# Patient Record
Sex: FEMALE | Race: WHITE | NOT HISPANIC OR LATINO | Employment: PART TIME | URBAN - METROPOLITAN AREA
[De-identification: names, ages, dates, MRNs, and addresses within clinical notes are randomized per-mention and may not be internally consistent; named-entity substitution may affect disease eponyms.]

---

## 2020-06-07 ENCOUNTER — OFFICE VISIT (OUTPATIENT)
Dept: URGENT CARE | Age: 55
End: 2020-06-07
Payer: COMMERCIAL

## 2020-06-07 ENCOUNTER — APPOINTMENT (OUTPATIENT)
Dept: RADIOLOGY | Age: 55
End: 2020-06-07
Payer: COMMERCIAL

## 2020-06-07 VITALS
WEIGHT: 130 LBS | BODY MASS INDEX: 22.2 KG/M2 | SYSTOLIC BLOOD PRESSURE: 106 MMHG | OXYGEN SATURATION: 98 % | DIASTOLIC BLOOD PRESSURE: 74 MMHG | HEIGHT: 64 IN | HEART RATE: 78 BPM | RESPIRATION RATE: 18 BRPM | TEMPERATURE: 97.3 F

## 2020-06-07 DIAGNOSIS — S90.01XA CONTUSION OF RIGHT ANKLE, INITIAL ENCOUNTER: ICD-10-CM

## 2020-06-07 DIAGNOSIS — S80.02XA CONTUSION OF LEFT KNEE, INITIAL ENCOUNTER: ICD-10-CM

## 2020-06-07 DIAGNOSIS — M25.562 ACUTE PAIN OF LEFT KNEE: ICD-10-CM

## 2020-06-07 DIAGNOSIS — S99.919A ANKLE INJURY, UNSPECIFIED LATERALITY, INITIAL ENCOUNTER: ICD-10-CM

## 2020-06-07 DIAGNOSIS — S82.892A CLOSED FRACTURE OF LEFT ANKLE, INITIAL ENCOUNTER: Primary | ICD-10-CM

## 2020-06-07 PROCEDURE — 73610 X-RAY EXAM OF ANKLE: CPT

## 2020-06-07 PROCEDURE — 99213 OFFICE O/P EST LOW 20 MIN: CPT | Performed by: PHYSICIAN ASSISTANT

## 2020-06-07 PROCEDURE — 29515 APPLICATION SHORT LEG SPLINT: CPT | Performed by: PHYSICIAN ASSISTANT

## 2020-06-07 PROCEDURE — 73564 X-RAY EXAM KNEE 4 OR MORE: CPT

## 2020-06-07 PROCEDURE — S9088 SERVICES PROVIDED IN URGENT: HCPCS | Performed by: PHYSICIAN ASSISTANT

## 2020-06-07 RX ORDER — CARISOPRODOL 350 MG/1
TABLET ORAL EVERY 6 HOURS PRN
COMMUNITY
Start: 2020-05-22

## 2020-06-07 RX ORDER — LIOTHYRONINE SODIUM 5 UG/1
TABLET ORAL
COMMUNITY
Start: 2018-10-19

## 2020-06-07 RX ORDER — TEMAZEPAM 30 MG/1
CAPSULE ORAL
COMMUNITY
Start: 2018-11-08

## 2020-06-07 RX ORDER — HYDROXYCHLOROQUINE SULFATE 200 MG/1
TABLET, FILM COATED ORAL
COMMUNITY
Start: 2020-06-06

## 2020-06-07 RX ORDER — AMITRIPTYLINE HYDROCHLORIDE 10 MG/1
TABLET, FILM COATED ORAL
COMMUNITY
Start: 2018-11-02

## 2020-06-07 RX ORDER — MAG HYDROX/ALUMINUM HYD/SIMETH 400-400-40
1 SUSPENSION, ORAL (FINAL DOSE FORM) ORAL DAILY
COMMUNITY
Start: 2014-10-13

## 2020-06-07 RX ORDER — CLONAZEPAM 1 MG/1
TABLET ORAL
COMMUNITY
Start: 2020-06-06

## 2020-06-07 RX ORDER — FLUOXETINE HYDROCHLORIDE 40 MG/1
CAPSULE ORAL
COMMUNITY
Start: 2020-05-26

## 2020-06-07 RX ORDER — METHYLPHENIDATE HYDROCHLORIDE EXTENDED RELEASE 10 MG/1
TABLET ORAL
COMMUNITY
Start: 2020-05-13

## 2020-06-07 RX ORDER — SACCHAROMYCES BOULARDII 250 MG
CAPSULE ORAL
COMMUNITY
Start: 2011-03-16

## 2020-06-10 ENCOUNTER — OFFICE VISIT (OUTPATIENT)
Dept: OBGYN CLINIC | Facility: MEDICAL CENTER | Age: 55
End: 2020-06-10
Payer: COMMERCIAL

## 2020-06-10 VITALS
WEIGHT: 130 LBS | BODY MASS INDEX: 22.2 KG/M2 | TEMPERATURE: 97.8 F | DIASTOLIC BLOOD PRESSURE: 63 MMHG | HEIGHT: 64 IN | SYSTOLIC BLOOD PRESSURE: 129 MMHG | HEART RATE: 68 BPM

## 2020-06-10 DIAGNOSIS — S82.65XA CLOSED NONDISPLACED FRACTURE OF LATERAL MALLEOLUS OF LEFT FIBULA, INITIAL ENCOUNTER: Primary | ICD-10-CM

## 2020-06-10 PROCEDURE — 99203 OFFICE O/P NEW LOW 30 MIN: CPT | Performed by: ORTHOPAEDIC SURGERY

## 2020-06-10 PROCEDURE — 27786 TREATMENT OF ANKLE FRACTURE: CPT | Performed by: ORTHOPAEDIC SURGERY

## 2020-06-11 ENCOUNTER — TELEPHONE (OUTPATIENT)
Dept: OBGYN CLINIC | Facility: HOSPITAL | Age: 55
End: 2020-06-11

## 2020-06-12 DIAGNOSIS — S82.65XA CLOSED NONDISPLACED FRACTURE OF LATERAL MALLEOLUS OF LEFT FIBULA, INITIAL ENCOUNTER: Primary | ICD-10-CM

## 2020-06-12 RX ORDER — TRAMADOL HYDROCHLORIDE 50 MG/1
50 TABLET ORAL EVERY 8 HOURS PRN
Qty: 15 TABLET | Refills: 0 | Status: SHIPPED | OUTPATIENT
Start: 2020-06-12

## 2020-06-16 ENCOUNTER — TELEPHONE (OUTPATIENT)
Dept: OBGYN CLINIC | Facility: MEDICAL CENTER | Age: 55
End: 2020-06-16

## 2020-06-17 ENCOUNTER — APPOINTMENT (OUTPATIENT)
Dept: RADIOLOGY | Facility: MEDICAL CENTER | Age: 55
End: 2020-06-17
Payer: COMMERCIAL

## 2020-06-17 ENCOUNTER — OFFICE VISIT (OUTPATIENT)
Dept: OBGYN CLINIC | Facility: MEDICAL CENTER | Age: 55
End: 2020-06-17

## 2020-06-17 VITALS — HEIGHT: 64 IN | TEMPERATURE: 97.8 F | BODY MASS INDEX: 22.2 KG/M2 | WEIGHT: 130 LBS

## 2020-06-17 DIAGNOSIS — S82.65XD CLOSED NONDISPLACED FRACTURE OF LATERAL MALLEOLUS OF LEFT FIBULA WITH ROUTINE HEALING, SUBSEQUENT ENCOUNTER: Primary | ICD-10-CM

## 2020-06-17 DIAGNOSIS — S82.65XD CLOSED NONDISPLACED FRACTURE OF LATERAL MALLEOLUS OF LEFT FIBULA WITH ROUTINE HEALING, SUBSEQUENT ENCOUNTER: ICD-10-CM

## 2020-06-17 PROCEDURE — 73610 X-RAY EXAM OF ANKLE: CPT

## 2020-06-17 PROCEDURE — 99024 POSTOP FOLLOW-UP VISIT: CPT | Performed by: ORTHOPAEDIC SURGERY

## 2020-07-14 ENCOUNTER — TELEPHONE (OUTPATIENT)
Dept: OBGYN CLINIC | Facility: CLINIC | Age: 55
End: 2020-07-14

## 2020-07-14 NOTE — TELEPHONE ENCOUNTER
May return for sooner visit  Thanks  If any swelling or severe pain to go to ER immediately   thanks

## 2020-07-14 NOTE — TELEPHONE ENCOUNTER
Pt called and would like to know if she could come in Wednesday 7/15 to get her cast off instead of Monday 7/20  Pt said she has Lupus and the cast has really been giving her a hard time  Please advise

## 2020-07-14 NOTE — TELEPHONE ENCOUNTER
Geovanna patient  Is to follow up with Jean Carlos Espinoza on 7/20/20 for cast off repeat xray  Patient wanting to come in tomorrow because cast aggravates lupus  Please let me know if this is acceptable or not  Thank you!

## 2020-07-15 ENCOUNTER — APPOINTMENT (OUTPATIENT)
Dept: RADIOLOGY | Facility: MEDICAL CENTER | Age: 55
End: 2020-07-15
Payer: COMMERCIAL

## 2020-07-15 ENCOUNTER — APPOINTMENT (OUTPATIENT)
Dept: LAB | Facility: MEDICAL CENTER | Age: 55
End: 2020-07-15
Payer: COMMERCIAL

## 2020-07-15 ENCOUNTER — OFFICE VISIT (OUTPATIENT)
Dept: OBGYN CLINIC | Facility: MEDICAL CENTER | Age: 55
End: 2020-07-15
Payer: COMMERCIAL

## 2020-07-15 VITALS
HEART RATE: 75 BPM | SYSTOLIC BLOOD PRESSURE: 133 MMHG | DIASTOLIC BLOOD PRESSURE: 82 MMHG | BODY MASS INDEX: 22.2 KG/M2 | HEIGHT: 64 IN | WEIGHT: 130 LBS

## 2020-07-15 DIAGNOSIS — S82.65XD CLOSED NONDISPLACED FRACTURE OF LATERAL MALLEOLUS OF LEFT FIBULA WITH ROUTINE HEALING, SUBSEQUENT ENCOUNTER: ICD-10-CM

## 2020-07-15 DIAGNOSIS — S82.65XD CLOSED NONDISPLACED FRACTURE OF LATERAL MALLEOLUS OF LEFT FIBULA WITH ROUTINE HEALING, SUBSEQUENT ENCOUNTER: Primary | ICD-10-CM

## 2020-07-15 LAB
25(OH)D3 SERPL-MCNC: 38.9 NG/ML (ref 30–100)
TSH SERPL DL<=0.05 MIU/L-ACNC: 1.69 UIU/ML (ref 0.36–3.74)

## 2020-07-15 PROCEDURE — 36415 COLL VENOUS BLD VENIPUNCTURE: CPT

## 2020-07-15 PROCEDURE — 73610 X-RAY EXAM OF ANKLE: CPT

## 2020-07-15 PROCEDURE — 84443 ASSAY THYROID STIM HORMONE: CPT

## 2020-07-15 PROCEDURE — 99213 OFFICE O/P EST LOW 20 MIN: CPT | Performed by: FAMILY MEDICINE

## 2020-07-15 PROCEDURE — 82306 VITAMIN D 25 HYDROXY: CPT

## 2020-07-15 NOTE — PROGRESS NOTES
1  Closed nondisplaced fracture of lateral malleolus of left fibula with routine healing, subsequent encounter  XR ankle 3+ vw left    Vitamin D 25 hydroxy    [facility]    TSH, 3rd generation with Free T4 reflex     Orders Placed This Encounter   Procedures    XR ankle 3+ vw left    Vitamin D 25 hydroxy    TSH, 3rd generation with Free T4 reflex    [facility]        Imaging Studies (I personally reviewed images in PACS and report):  X-ray left ankle 07/15/2020:  Stable alignment nondisplaced distal fibular Cordova a fracture with interval healing and sclerosis but without complete obliteration of fracture line    IMPRESSION:  Left Ankle Cordova A Nondisplaced Distal Fibular Fracture  DOI: 6/7/20  FUI: 5 weeks 3 days      Repeat X-ray next visit:        Return in about 3 weeks (around 8/5/2020)  Patient Instructions   Cessation of cast  Transition to controlled ankle motion boot  Start physical therapy for range-of-motion exercises and gentle strengthening  Pleasevitamin-D check  Reviewed red flags including severe pain swelling and instructed to go to emergency department if develops    I recommend vitamin D 800-1000 IUs (international units) and Calcium 1500mg per day to help promote fracture healing  Calcium pills can lead to constipation and I recommend increasing calcium through the diet via dairy such as yogurt, cheese, or milk as tolerated  Vitamin D is usually taken by mouth in pill form over the counter  Sometimes vitamin D levels are too low and physicians may consider checking a blood test to see if you require extra Vitamin D for catch-up dosing if you are already low  CHIEF COMPLAINT:  Follow-up left ankle fracture    HPI:  Angeli Yarbrough is a 47 y o  female  who presents for       Visit 7/15/2020 :  68-year-old female with past medical history significant for lupus to presents for re-evaluation of ankle fracture    Patient previously seen by 1 of my colleagues for Art [place] fracture status post traumatic event including fall rising from a seated position  She was placed in a short leg cast made nonweightbearing  She follows up today with complaints of stiffness diffusely associated with lupus exacerbation from wearing her cast and being nonweightbearing  Review of Systems   Constitutional: Negative for chills and fever  Neurological: Negative for weakness and numbness  Following history reviewed and update:    Past Medical History:   Diagnosis Date    Disease of thyroid gland     Fibromyalgia     Lupus ([address] )     Proctitis     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HCC)     Vitiligo      Past Surgical History:   Procedure Laterality Date    ADENOIDECTOMY      APPENDECTOMY      CHOLECYSTECTOMY      OVARIAN CYST SURGERY      TONSILLECTOMY       Social History   Social History     Substance and Sexual Activity   Alcohol Use Not Currently     Social History     Substance and Sexual Activity   Drug Use Never     Social History     Tobacco Use   Smoking Status Never Smoker   Smokeless Tobacco Never Used     History reviewed  No pertinent family history  Allergies   Allergen Reactions    Androgens      Other reaction(s): Vomiting    Sulfa Antibiotics Other (See Comments)     Hives/Urticaria -Erythema Multiforme          Physical Exam  /82   Pulse 75   Ht 5' 4" (1 626 m)   Wt 59 kg (130 lb)   BMI 22 31 kg/m²     Constitutional:  see vital signs  Gen: well-developed, normocephalic/atraumatic, well-groomed  Eyes: No inflammation or discharge of conjunctiva or lids; sclera clear   Pharynx: no inflammation, lesion, or mass of lips  Neck: supple, no masses, non-distended  MSK: no inflammation, lesion, mass, or clubbing of nails and digits except for other than mentioned below  SKIN: no visible rashes or skin lesions  Pulmonary/Chest: Effort normal  No respiratory distress     NEURO: cranial nerves grossly intact  PSYCH:  Alert and oriented to person, place, and time; recent and remote memory intact; mood normal, no depression, anxiety, or agitation, judgment and insight good and intact     Ortho Exam    LEFT ANKLE  EXAM  Observation  GAIT:  Antalgic left mild    Inspection  Erythema: no  Ecchymosis: no  Edema:  Mild lateral ankle edema      Tenderness  Proximal Fibula: no  (Maisonneuve frx)  AiTFL: no  (2cm proximal-medial to tip lateral malleolus 92% sens, 29% spec)  ATFL: no  CFL: no  PTFL: no  Achilles:  no  deltoid: No  Peroneal: no  Tibialis Anterior: no  Tibialis Posterior: no     Bony Tenderpoints:  Lateral Malleolus: +  Base of 5th MT: no  Medial Malleolus: no  Navicular: no  Talar dome: No    ROM  Dorsiflexion: intact  Plantarflexion: intact    Muscle Strength  Pronation: intact without pain  Supination: intact without pain    Tib-Fib Squeeze: negative  (cgskbhrmwfwq-wa-brilawimdpkkfu squeeze; 26% sens, 88% spec; rule in test)    Calcaneal Squeeze: negative    Dorsiflexion (+) ER Stress Test: negative  (reproduce ATiFL mech; 71% sens, 63% spec)      Procedures

## 2020-07-15 NOTE — PATIENT INSTRUCTIONS
Cessation of cast  Transition to controlled ankle motion boot  Start physical therapy for range-of-motion exercises and gentle strengthening  Pleasevitamin-D check  Reviewed red flags including severe pain swelling and instructed to go to emergency department if develops    I recommend vitamin D 800-1000 IUs (international units) and Calcium 1500mg per day to help promote fracture healing  Calcium pills can lead to constipation and I recommend increasing calcium through the diet via dairy such as yogurt, cheese, or milk as tolerated  Vitamin D is usually taken by mouth in pill form over the counter  Sometimes vitamin D levels are too low and physicians may consider checking a blood test to see if you require extra Vitamin D for catch-up dosing if you are already low

## 2020-08-02 ENCOUNTER — OFFICE VISIT (OUTPATIENT)
Dept: URGENT CARE | Age: 55
End: 2020-08-02
Payer: COMMERCIAL

## 2020-08-02 VITALS
TEMPERATURE: 98 F | OXYGEN SATURATION: 96 % | SYSTOLIC BLOOD PRESSURE: 119 MMHG | DIASTOLIC BLOOD PRESSURE: 71 MMHG | HEIGHT: 64 IN | WEIGHT: 130 LBS | HEART RATE: 84 BPM | RESPIRATION RATE: 18 BRPM | BODY MASS INDEX: 22.2 KG/M2

## 2020-08-02 DIAGNOSIS — B49 FUNGAL INFECTION: Primary | ICD-10-CM

## 2020-08-02 PROCEDURE — S9088 SERVICES PROVIDED IN URGENT: HCPCS | Performed by: NURSE PRACTITIONER

## 2020-08-02 PROCEDURE — 99213 OFFICE O/P EST LOW 20 MIN: CPT | Performed by: NURSE PRACTITIONER

## 2020-08-02 RX ORDER — NYSTATIN 100000 U/G
OINTMENT TOPICAL 2 TIMES DAILY
Qty: 30 G | Refills: 0 | Status: SHIPPED | OUTPATIENT
Start: 2020-08-02

## 2020-08-02 NOTE — PROGRESS NOTES
NAME: Raphael Fraser is a 54 y o  female  : 1965    MRN: [id number]    /71   Pulse 84   Temp 98 °F (36 7 °C)   Resp 18   Ht 5' 4"   Wt 59 kg (130 lb)   SpO2 96%   BMI 22 31 kg/m²     Assessment and Plan   Fungal infection [B49]  1  Fungal infection  nystatin (MYCOSTATIN) ointment       Will Reges was seen today for rash  Diagnoses and all orders for this visit:    Fungal infection  -     nystatin (MYCOSTATIN) ointment; Apply topically 2 (two) times a day        Patient Instructions   Patient Instructions   Use ointment to the area of the rash  May use up to two times a day  Rest   Follow up with pcp    Proceed to ER if symptoms worsen  Chief Complaint     Chief Complaint   Patient presents with    Rash     started last night, Pt noticed that where she has a scar on her lower abdominal it was bright red and pussing           History of Present Illness     53 yo female here today with a rash under the abdominal fold, present for the past few days, itchy in nature and bothersome  No fevers      Review of Systems   Review of Systems   Constitutional: Negative for fever  Skin: Positive for rash (under the abdominal fold)           Current Medications       Current Outpatient Medications:     amitriptyline (ELAVIL) 10 mg tablet, , Disp: , Rfl:     carisoprodol (SOMA) 350 mg tablet, every 6 (six) hours as needed , Disp: , Rfl:     Cholecalciferol (VITAMIN D3) 125 MCG (5000 UT) CAPS, Take 1 capsule by mouth daily, Disp: , Rfl:     clonazePAM (KlonoPIN) 1 mg tablet, , Disp: , Rfl:     FLUoxetine (PROzac) 40 MG capsule, TAKE TWO CAPSULES BY MOUTH ONCE EVERY DAY, Disp: , Rfl:     hydroxychloroquine (PLAQUENIL) 200 mg tablet, , Disp: , Rfl:     liothyronine (CYTOMEL) 5 mcg tablet, , Disp: , Rfl:     methylphenidate (METADATE ER) 10 MG ER tablet, TAKE ONE TABLET BY MOUTH TWICE DAILY IN THE MORNING AND IN THE AFTERNOON, Disp: , Rfl:     Multiple Vitamins-Minerals (MULTIVITAMIN ADULT PO), 1 daily, Disp: , Rfl:     nystatin (MYCOSTATIN) ointment, Apply topically 2 (two) times a day, Disp: 30 g, Rfl: 0    OMEGA-3 FATTY ACIDS PO, Take 1 capsule by mouth daily, Disp: , Rfl:     saccharomyces boulardii (FLORASTOR) 250 mg capsule, 1 daily, Disp: , Rfl:     temazepam (RESTORIL) 30 mg capsule, temazepam 30 mg capsule, Disp: , Rfl:     traMADol (ULTRAM) 50 mg tablet, Take 1 tablet (50 mg total) by mouth every 8 (eight) hours as needed for moderate pain, Disp: 15 tablet, Rfl: 0    Current Allergies     Allergies as of 08/02/2020 - Reviewed 08/02/2020   Allergen Reaction Noted    Androgens  09/09/2019    Sulfa antibiotics Other (See Comments) 06/05/2015              Past Medical History:   Diagnosis Date    Disease of thyroid gland     Fibromyalgia     Lupus ([address] )     Proctitis     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HCC)     Vitiligo        Past Surgical History:   Procedure Laterality Date    ADENOIDECTOMY      APPENDECTOMY      CHOLECYSTECTOMY      OVARIAN CYST SURGERY      TONSILLECTOMY         History reviewed  No pertinent family history  Medications have been verified  The following portions of the patient's history were reviewed and updated as appropriate: allergies, current medications, past family history, past medical history, past social history, past surgical history and problem list     Objective   /71   Pulse 84   Temp 98 °F (36 7 °C)   Resp 18   Ht 5' 4"   Wt 59 kg (130 lb)   SpO2 96%   BMI 22 31 kg/m²      Physical Exam     Physical Exam   Neurological: She is alert  Skin: Rash (fungal rash present) noted              SHAY Cain

## 2020-08-05 ENCOUNTER — APPOINTMENT (OUTPATIENT)
Dept: RADIOLOGY | Facility: MEDICAL CENTER | Age: 55
End: 2020-08-05
Payer: COMMERCIAL

## 2020-08-05 ENCOUNTER — OFFICE VISIT (OUTPATIENT)
Dept: OBGYN CLINIC | Facility: MEDICAL CENTER | Age: 55
End: 2020-08-05
Payer: COMMERCIAL

## 2020-08-05 VITALS
WEIGHT: 130 LBS | DIASTOLIC BLOOD PRESSURE: 77 MMHG | SYSTOLIC BLOOD PRESSURE: 121 MMHG | HEART RATE: 79 BPM | BODY MASS INDEX: 22.2 KG/M2 | HEIGHT: 64 IN | TEMPERATURE: 97.5 F

## 2020-08-05 DIAGNOSIS — S82.65XD CLOSED NONDISPLACED FRACTURE OF LATERAL MALLEOLUS OF LEFT FIBULA WITH ROUTINE HEALING, SUBSEQUENT ENCOUNTER: Primary | ICD-10-CM

## 2020-08-05 DIAGNOSIS — S82.65XD CLOSED NONDISPLACED FRACTURE OF LATERAL MALLEOLUS OF LEFT FIBULA WITH ROUTINE HEALING, SUBSEQUENT ENCOUNTER: ICD-10-CM

## 2020-08-05 PROCEDURE — 73610 X-RAY EXAM OF ANKLE: CPT

## 2020-08-05 PROCEDURE — 99213 OFFICE O/P EST LOW 20 MIN: CPT | Performed by: FAMILY MEDICINE

## 2020-08-05 NOTE — PATIENT INSTRUCTIONS
Cease controlled ankle motion boot  Start lace-up ankle brace for high-risk activity including long walks  Start physical therapy

## 2020-08-05 NOTE — PROGRESS NOTES
1  Closed nondisplaced fracture of lateral malleolus of left fibula with routine healing, subsequent encounter  XR ankle 3+ vw left    [facility]     Orders Placed This Encounter   Procedures    XR ankle 3+ vw left    [facility]        Imaging Studies (I personally reviewed images in PACS and report):  X-ray left ankle 08/05/2020:  Stable alignment nondisplaced distal fibular Cordova a fracture with significant interval healing but without complete obliteration of fracture line  Past Diagnostics:  X-ray left ankle 07/15/2020:  Stable alignment nondisplaced distal fibular Cordova a fracture with interval healing and sclerosis but without complete obliteration of fracture line    IMPRESSION:  Left Ankle Cordova A Nondisplaced Distal Fibular Fracture  DOI: 6/7/20  FUI: 8 weeks 3 days      Repeat X-ray next visit:        Return in about 4 weeks (around 9/2/2020)  Patient Instructions   Cease controlled ankle motion boot  Start lace-up ankle brace for high-risk activity including long walks  Start physical therapy          CHIEF COMPLAINT:  Follow-up left ankle fracture    HPI:  Cesario Ly is a 54 y o  female  who presents for     08/05/2020: Follow-up left ankle fracture:  Improving  Overall patient feels approximately 95% improved  She has continued use her controlled ankle motion boot with no pain  Review of Systems   Constitutional: Negative for chills and fever  Neurological: Negative for weakness and numbness         Following history reviewed and update:    Past Medical History:   Diagnosis Date    Disease of thyroid gland     Fibromyalgia     Lupus ([address] )     Proctitis     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HCC)     Vitiligo      Past Surgical History:   Procedure Laterality Date    ADENOIDECTOMY      APPENDECTOMY      CHOLECYSTECTOMY      OVARIAN CYST SURGERY      TONSILLECTOMY       Social History   Social History     Substance and Sexual Activity   Alcohol Use Not Currently Social History     Substance and Sexual Activity   Drug Use Never     Social History     Tobacco Use   Smoking Status Never Smoker   Smokeless Tobacco Never Used     History reviewed  No pertinent family history  Allergies   Allergen Reactions    Androgens      Other reaction(s): Vomiting    Sulfa Antibiotics Other (See Comments)     Hives/Urticaria -Erythema Multiforme          Physical Exam  /77   Pulse 79   Temp 97 5 °F (36 4 °C)   Ht 5' 4" (1 626 m)   Wt 59 kg (130 lb)   BMI 22 31 kg/m²     Constitutional:  see vital signs  Gen: well-developed, normocephalic/atraumatic, well-groomed  Eyes: No inflammation or discharge of conjunctiva or lids; sclera clear   Pharynx: no inflammation, lesion, or mass of lips  Neck: supple, no masses, non-distended  MSK: no inflammation, lesion, mass, or clubbing of nails and digits except for other than mentioned below  SKIN: no visible rashes or skin lesions  Pulmonary/Chest: Effort normal  No respiratory distress     NEURO: cranial nerves grossly intact  PSYCH:  Alert and oriented to person, place, and time; recent and remote memory intact; mood normal, no depression, anxiety, or agitation, judgment and insight good and intact      Ortho Exam    LEFT ANKLE  EXAM  Observation  GAIT:  normal out of [place] office    Inspection  Erythema: no  Ecchymosis: no  Edema:  none    Tenderness  Proximal Fibula: no  (Maisonneuve frx)  AiTFL: no  (2cm proximal-medial to tip lateral malleolus 92% sens, 29% spec)  ATFL: no  CFL: no  PTFL: no  Achilles:  no  deltoid: No  Peroneal: no  Tibialis Anterior: no  Tibialis Posterior: no     Bony Tenderpoints:  Lateral Malleolus: no  Base of 5th MT: no  Medial Malleolus: no  Navicular: no  Talar dome: No    ROM  Dorsiflexion: intact  Plantarflexion: intact    Muscle Strength  Pronation: intact without pain  Supination: intact without pain    Tib-Fib Squeeze: negative  (tysfiipykcik-mn-hbgcsupkyyvyse squeeze; 26% sens, 88% spec; rule in test)    Calcaneal Squeeze: negative    Dorsiflexion (+) ER Stress Test: negative  (reproduce ATiFL mech; 71% sens, 63% spec)        Procedures

## 2020-08-13 ENCOUNTER — EVALUATION (OUTPATIENT)
Dept: PHYSICAL THERAPY | Facility: MEDICAL CENTER | Age: 55
End: 2020-08-13
Payer: COMMERCIAL

## 2020-08-13 DIAGNOSIS — S82.65XD CLOSED NONDISPLACED FRACTURE OF LATERAL MALLEOLUS OF LEFT FIBULA WITH ROUTINE HEALING: Primary | ICD-10-CM

## 2020-08-13 DIAGNOSIS — M25.572 LEFT ANKLE PAIN, UNSPECIFIED CHRONICITY: ICD-10-CM

## 2020-08-13 PROCEDURE — 97161 PT EVAL LOW COMPLEX 20 MIN: CPT | Performed by: PHYSICAL THERAPIST

## 2020-08-13 PROCEDURE — 97112 NEUROMUSCULAR REEDUCATION: CPT | Performed by: PHYSICAL THERAPIST

## 2020-08-13 PROCEDURE — 97110 THERAPEUTIC EXERCISES: CPT | Performed by: PHYSICAL THERAPIST

## 2020-08-13 NOTE — PROGRESS NOTES
PT Evaluation     Today's date: 2020  Patient name: Aixa Oneal  : 1965  MRN: [id number]  Referring provider: Osiris Medina  Dx:   Encounter Diagnosis     ICD-10-CM    1  Closed nondisplaced fracture of lateral malleolus of left fibula with routine healing  S82 65XD    2  Left ankle pain, unspecified chronicity  M25 572                   Assessment  Assessment details: Ms Isidro Victoria is a very pleasant 54 y o  female who presents today roughly 2 months s/p closed non-displaced fracture of the left lateral malleolus  No further referral appears necessary at this time based upon examination findings  Patient presents with primary movement diagnosis of left ankle motor control impairments consistent with referring diagnosis leading to pain and activity intolerance to walking, specifically for exercise  Patient demonstrates excellent ankle ROM but does exhibit increased mobility to ankle inversion bilaterally  Patient has good strength  Patient is an excellent candidate for outpatient physical therapy services to address the observed impairments to improve function and return to walking for exercise and stress management  Prognosis is good given compliance with PT attendance and HEP performance  Please contact me with any questions  Thank you for the referral    Impairments: abnormal muscle firing, activity intolerance, impaired balance, impaired physical strength and lacks appropriate home exercise program  Barriers to therapy: Lupus, fibromyalgia  Understanding of Dx/Px/POC: good   Prognosis: good    Goals  1  Patient will be independent in individualized HEP  2  Patient will improve left ankle strength to 5/5 in all planes  3  Patient will be improve SLS to WNL compared to contralateral side  4  Patient will be able to walk for exercise without limitation due to pain at time of discharge  5  Patient will achieve score on FOTO by MDIC by time of discharge        Plan  Patient would benefit from: skilled physical therapy  Planned modality interventions: cryotherapy  Planned therapy interventions: manual therapy, patient education, strengthening, therapeutic activities, therapeutic exercise, stretching, functional ROM exercises, home exercise program and balance  Frequency: 1x week  Duration in weeks: 6  Plan of Care beginning date: 2020  Plan of Care expiration date: 2020  Treatment plan discussed with: patient        Subjective Evaluation    History of Present Illness  Date of onset: 2020  Mechanism of injury: Ms Josafat Milligan is a 54 y o  female who presents today with reports of left ankle pain and stiffness s/p fibula fracture  Patient reports she sitting in an antigravity lounge chair when she went to get up she twisted her ankle  She reported immediate pain and swelling  That night she followed up with urgent care and received x-rays revealing a non displaced fracture of the left lateral malleolus  She was casted for 5-6 weeks, NWB for 2 weeks then transitioning to WBAT  When the cast was removed she was placed in a walking boot for 3 weeks and then transitioned to a lace up ankle brace  She states her pain is now well managed but it can fluctuate with changes in weather  Patient enjoys walking and uses this as an outlet for stress relief and is eager to return to this activity at [facility]  Pain  Current pain ratin  At best pain ratin  At worst pain ratin  Relieving factors: ice, heat and rest (elevate)  Exacerbated by: weather changes, prolonged walking  Progression: improved    Social Support    Employment status: not working  Exercise comments: 6-7 miles/day pre-injury  Diagnostic Tests  X-ray: abnormal (non-displaced fracture of left lateral malleolus, most recent x-ray shows healing and normal alignment)  Patient Goals  Patient goals for therapy: return to sport/leisure activities  Patient's goals regarding treatment: wear a regular shoe          Objective     Observations Left Ankle/Foot   Negative for edema and effusion  Additional Observation Details  SLS: right  = good, left = fair-good with increased ankle strategy    Tenderness   Left Ankle/Foot   No tenderness in the anterior talofibular ligament, fifth metatarsal base, first metatarsal head, lateral malleolus, medial malleolus, metatarsal head, peroneal tendon and posterior tibial tendon  Right Ankle/Foot   No tenderness in the anterior talofibular ligament, fifth metatarsal base, first metatarsal head, lateral malleolus and medial malleolus       Active Range of Motion   Left Ankle/Foot   Dorsiflexion (ke): 10 degrees   Dorsiflexion (kf): 16 degrees   Plantar flexion: 50 degrees   Inversion: 62 degrees with pain  Eversion: 20 degrees     Right Ankle/Foot   Normal active range of motion    Additional Active Range of Motion Details  Hypermobility ankle inversion bilaterally    Strength/Myotome Testing     Left Ankle/Foot   Dorsiflexion: 5  Plantar flexion: 4+ (x5 SL HR with decreased clearance)  Inversion: 4+  Eversion: 4+    Right Ankle/Foot   Normal strength    Tests     Additional Tests Details  Not indicated    Swelling   Left Ankle/Foot   Metatarsal heads: 20 5 cm  Figure 8: 51 cm  Malleoli: 24 5 cm    Right Ankle/Foot   Metatarsal heads: 20 5 cm  Figure 8: 51 cm  Malleoli: 25 cm      Flowsheet Rows      Most Recent Value   PT/OT G-Codes   Current Score  72   Projected Score  77   FOTO information reviewed  Yes             Precautions:lupus, fibromyalgia  DOI:6/7/2020      Manuals 8/13                                                                Neuro Re-Ed             Ankle ABC's nv            Ankle TB 4-way [place] nv            BAPs board nv            Tandem walking nv            SLS ABC's             rockerboard                          Ther Ex             Bike- active warm up nv            Calf stretch standing nv            Heel raises nv            Toe raises nv HEP instruction and performance CK            Ther Activity                                                                              Modalities             CP post tx prn

## 2020-08-18 ENCOUNTER — APPOINTMENT (OUTPATIENT)
Dept: PHYSICAL THERAPY | Facility: MEDICAL CENTER | Age: 55
End: 2020-08-18
Payer: COMMERCIAL

## 2020-08-26 ENCOUNTER — TELEPHONE (OUTPATIENT)
Dept: OBGYN CLINIC | Facility: MEDICAL CENTER | Age: 55
End: 2020-08-26

## 2020-08-26 NOTE — TELEPHONE ENCOUNTER
Patient has appt with Dr Joy Poole on 9/2  Doctor will be out of the office  I called patient and no answer  LVM to give us a call back to reschedule  He can be rescheduled with Dr Aishwarya Jansen or with Dr April Poole will no longer in be the [place] office as of the week of 8/31  He will be in the [place] or [facility] office

## 2020-08-27 ENCOUNTER — OFFICE VISIT (OUTPATIENT)
Dept: PHYSICAL THERAPY | Facility: MEDICAL CENTER | Age: 55
End: 2020-08-27
Payer: COMMERCIAL

## 2020-08-27 DIAGNOSIS — S82.65XD CLOSED NONDISPLACED FRACTURE OF LATERAL MALLEOLUS OF LEFT FIBULA WITH ROUTINE HEALING: Primary | ICD-10-CM

## 2020-08-27 DIAGNOSIS — M25.572 LEFT ANKLE PAIN, UNSPECIFIED CHRONICITY: ICD-10-CM

## 2020-08-27 PROCEDURE — 97110 THERAPEUTIC EXERCISES: CPT | Performed by: PHYSICAL THERAPIST

## 2020-08-27 PROCEDURE — 97112 NEUROMUSCULAR REEDUCATION: CPT | Performed by: PHYSICAL THERAPIST

## 2020-08-27 NOTE — PROGRESS NOTES
Daily Note     Today's date: 2020  Patient name: Yenny Muhammad  : 1965  MRN: [id number]  Referring provider: Eren Henry  Dx:   Encounter Diagnosis     ICD-10-CM    1  Closed nondisplaced fracture of lateral malleolus of left fibula with routine healing  S82 65XD    2  Left ankle pain, unspecified chronicity  M25 572                   Subjective: Patient states she walked 3 miles last week which she thinks was too much  She was very sore after but has returned to baseline  Objective: See treatment diary below      Assessment: Patient tolerated treatment session well  Treatment session focused on motor control and ankle proprioception  Patient required moderate cueing for isolated inversion and eversion with TB exercises  Patient also required cueing for speed with exercises to prevent her from rushing through them  She stated she felt good post treatment session  She will benefit from continued PT services to improve left ankle motor control and proprioception to allow her to return to participation in all activities at [facility]  Plan: Continue per plan of care  Progress treatment as tolerated         Precautions:lupus, fibromyalgia  DOI:2020      Manuals                                                                Neuro Re-Ed             Ankle ABC's nv HEP           Ankle TB 4-way [place] nv [place] x30 ea           BAPs board nv L3 x20 ea           Tandem walking nv 20 ft x5           SLS ABC's  10" x10           rockerboard  nv                        Ther Ex             Bike- active warm up nv x10'           Calf stretch standing nv 30"x3           Heel raises nv 3x10           Toe raises nv 3x10           Step ups w/ SL balance  L2 x20           Lateral step downs  nv                        HEP instruction and performance CK            Ther Activity                                                                              Modalities             CP post tx prn

## 2020-09-06 ENCOUNTER — OFFICE VISIT (OUTPATIENT)
Dept: URGENT CARE | Age: 55
End: 2020-09-06
Payer: COMMERCIAL

## 2020-09-06 VITALS
HEIGHT: 64 IN | SYSTOLIC BLOOD PRESSURE: 111 MMHG | OXYGEN SATURATION: 99 % | WEIGHT: 140.4 LBS | BODY MASS INDEX: 23.97 KG/M2 | TEMPERATURE: 97.2 F | HEART RATE: 79 BPM | DIASTOLIC BLOOD PRESSURE: 56 MMHG | RESPIRATION RATE: 18 BRPM

## 2020-09-06 DIAGNOSIS — N34.2 INFECTIVE URETHRITIS: Primary | ICD-10-CM

## 2020-09-06 DIAGNOSIS — J01.40 ACUTE NON-RECURRENT PANSINUSITIS: ICD-10-CM

## 2020-09-06 LAB
SL AMB  POCT GLUCOSE, UA: NEGATIVE
SL AMB LEUKOCYTE ESTERASE,UA: ABNORMAL
SL AMB POCT BILIRUBIN,UA: ABNORMAL
SL AMB POCT BLOOD,UA: ABNORMAL
SL AMB POCT CLARITY,UA: CLEAR
SL AMB POCT COLOR,UA: ABNORMAL
SL AMB POCT KETONES,UA: NEGATIVE
SL AMB POCT NITRITE,UA: NEGATIVE
SL AMB POCT PH,UA: 5
SL AMB POCT SPECIFIC GRAVITY,UA: 1.01
SL AMB POCT URINE PROTEIN: NEGATIVE
SL AMB POCT UROBILINOGEN: 0.2

## 2020-09-06 PROCEDURE — 87086 URINE CULTURE/COLONY COUNT: CPT | Performed by: PHYSICIAN ASSISTANT

## 2020-09-06 PROCEDURE — S9088 SERVICES PROVIDED IN URGENT: HCPCS | Performed by: PHYSICIAN ASSISTANT

## 2020-09-06 PROCEDURE — 81002 URINALYSIS NONAUTO W/O SCOPE: CPT | Performed by: PHYSICIAN ASSISTANT

## 2020-09-06 PROCEDURE — 99213 OFFICE O/P EST LOW 20 MIN: CPT | Performed by: PHYSICIAN ASSISTANT

## 2020-09-06 RX ORDER — AMOXICILLIN AND CLAVULANATE POTASSIUM 875; 125 MG/1; MG/1
1 TABLET, FILM COATED ORAL EVERY 12 HOURS SCHEDULED
Qty: 14 TABLET | Refills: 0 | Status: SHIPPED | OUTPATIENT
Start: 2020-09-06 | End: 2020-09-13

## 2020-09-06 NOTE — PATIENT INSTRUCTIONS
Antibiotic as directed until completed  Motrin and/or Tylenol as needed for fevers aches and pains  Drink plenty of fluids stay well hydrated  Continue home medications as prescribed  Follow up with PCP in 3-5 days  Proceed to  ER if symptoms worsen  Urinary Tract Infection in Women   AMBULATORY CARE:   A urinary tract infection (UTI)  is caused by bacteria that get inside your urinary tract  Most bacteria that enter your urinary tract come out when you urinate  If the bacteria stay in your urinary tract, you may get an infection  Your urinary tract includes your kidneys, ureters, bladder, and urethra  Urine is made in your kidneys, and it flows from the ureters to the bladder  Urine leaves the bladder through the urethra  A UTI is more common in your lower urinary tract, which includes your bladder and urethra  Common symptoms include the following:   · Urinating more often or waking from sleep to urinate    · Pain or burning when you urinate    · Pain or pressure in your lower abdomen     · Urine that smells bad    · Blood in your urine    · Leaking urine  Seek care immediately if:   · You are urinating very little or not at all  · You have a high fever with shaking chills  · You have side or back pain that gets worse  Contact your healthcare provider if:   · You have a fever  · You do not feel better after 2 days of taking antibiotics  · You are vomiting  · You have questions or concerns about your condition or care  Treatment for a UTI  may include medicines to treat a bacterial infection  You may also need medicines to decrease pain and burning, or decrease the urge to urinate often  Prevent a UTI:   · Empty your bladder often  Urinate and empty your bladder as soon as you feel the need  Do not hold your urine for long periods of time  · Wipe from front to back after you urinate or have a bowel movement    This will help prevent germs from getting into your urinary tract through your urethra  · Drink liquids as directed  Ask how much liquid to drink each day and which liquids are best for you  You may need to drink more liquids than usual to help flush out the bacteria  Do not drink alcohol, caffeine, or citrus juices  These can irritate your bladder and increase your symptoms  Your healthcare provider may recommend cranberry juice to help prevent a UTI  · Urinate after you have sex  This can help flush out bacteria passed during sex  · Do not douche or use feminine deodorants  These can change the chemical balance in your vagina  · Change sanitary pads or tampons often  This will help prevent germs from getting into your urinary tract  · Do pelvic muscle exercises often  Pelvic muscle exercises may help you start and stop urinating  Strong pelvic muscles may help you empty your bladder easier  Squeeze these muscles tightly for 5 seconds like you are trying to hold back urine  Then relax for 5 seconds  Gradually work up to squeezing for 10 seconds  Do 3 sets of 15 repetitions a day, or as directed  Follow up with your healthcare provider as directed:  Write down your questions so you remember to ask them during your visits  © 2017 [facility] Information is for End User's use only and may not be sold, redistributed or otherwise used for commercial purposes  All illustrations and images included in CareNotes® are the copyrighted property of [employer] D [employer]  or [employer]  The above information is an  only  It is not intended as medical advice for individual conditions or treatments  Talk to your doctor, nurse or pharmacist before following any medical regimen to see if it is safe and effective for you  Sinusitis   AMBULATORY CARE:   Sinusitis  is inflammation or infection of your sinuses  It is most often caused by a virus  Acute sinusitis may last up to 12 weeks  Chronic sinusitis lasts longer than 12 weeks  Recurrent sinusitis means you have 4 or more times in 1 year  Common symptoms include the following:   · Fever    · Pain, pressure, redness, or swelling around the forehead, cheeks, or eyes    · Thick yellow or green discharge from your nose    · Tenderness when you touch your face over your sinuses    · Dry cough that happens mostly at night or when you lie down    · Headache and face pain that is worse when you lean forward    · Tooth pain, or pain when you chew  Seek care immediately if:   · Your eye and eyelid are red, swollen, and painful  · You cannot open your eye  · You have vision changes, such as double vision  · Your eyeball bulges out or you cannot move your eye  · You are more sleepy than normal, or you notice changes in your ability to think, move, or talk  · You have a stiff neck, a fever, or a bad headache  · You have swelling of your forehead or scalp  Contact your healthcare provider if:   · Your symptoms do not improve after 3 days  · Your symptoms do not go away after 10 days  · You have nausea and are vomiting  · Your nose is bleeding  · You have questions or concerns about your condition or care  Treatment for sinusitis:  Your symptoms may go away on their own  Your healthcare provider may recommend watchful waiting for up to 10 days before starting antibiotics  You may  need any of the following:  · Acetaminophen  decreases pain and fever  It is available without a doctor's order  Ask how much to take and how often to take it  Follow directions  Read the labels of all other medicines you are using to see if they also contain acetaminophen, or ask your doctor or pharmacist  Acetaminophen can cause liver damage if not taken correctly  Do not use more than 4 grams (4,000 milligrams) total of acetaminophen in one day  · NSAIDs , such as ibuprofen, help decrease swelling, pain, and fever  This medicine is available with or without a doctor's order   NSAIDs can cause stomach bleeding or kidney problems in certain people  If you take blood thinner medicine, always ask your healthcare provider if NSAIDs are safe for you  Always read the medicine label and follow directions  · Nasal steroid sprays  may help decrease inflammation in your nose and sinuses  · Decongestants  help reduce swelling and drain mucus in the nose and sinuses  They may help you breathe easier  · Antihistamines  help dry mucus in the nose and relieve sneezing  · Antibiotics  help treat or prevent a bacterial infection  · Take your medicine as directed  Contact your healthcare provider if you think your medicine is not helping or if you have side effects  Tell him or her if you are allergic to any medicine  Keep a list of the medicines, vitamins, and herbs you take  Include the amounts, and when and why you take them  Bring the list or the pill bottles to follow-up visits  Carry your medicine list with you in case of an emergency  Self-care:   · Rinse your sinuses  Use a sinus rinse device to rinse your nasal passages with a saline (salt water) solution or distilled water  Do not use tap water  This will help thin the mucus in your nose and rinse away pollen and dirt  It will also help reduce swelling so you can breathe normally  Ask your healthcare provider how often to do this  · Breathe in steam   Heat a bowl of water until you see steam  Lean over the bowl and make a tent over your head with a large towel  Breathe deeply for about 20 minutes  Be careful not to get too close to the steam or burn yourself  Do this 3 times a day  You can also breathe deeply when you take a hot shower  · Sleep with your head elevated  Place an extra pillow under your head before you go to sleep to help your sinuses drain  · Drink liquids as directed  Ask your healthcare provider how much liquid to drink each day and which liquids are best for you   Liquids will thin the mucus in your nose and help it drain  Avoid drinks that contain alcohol or caffeine  · Do not smoke, and avoid secondhand smoke  Nicotine and other chemicals in cigarettes and cigars can make your symptoms worse  Ask your healthcare provider for information if you currently smoke and need help to quit  E-cigarettes or smokeless tobacco still contain nicotine  Talk to your healthcare provider before you use these products  Prevent the spread of germs that cause sinusitis:  Wash your hands often with soap and water  Wash your hands after you use the bathroom, change a child's diaper, or sneeze  Wash your hands before you prepare or eat food  Follow up with your healthcare provider as directed: You may be referred to an ear, nose, and throat specialist  Write down your questions so you remember to ask them during your visits  © 2017 [facility] [facility]  Information is for End User's use only and may not be sold, redistributed or otherwise used for commercial purposes  All illustrations and images included in CareNotes® are the copyrighted property of A [employer] M [employer] Inc  or [employer] Bowers  The above information is an  only  It is not intended as medical advice for individual conditions or treatments  Talk to your doctor, nurse or pharmacist before following any medical regimen to see if it is safe and effective for you

## 2020-09-06 NOTE — PROGRESS NOTES
[facility]        NAME: Yenny Muhammad is a 54 y o  female  : 1965    MRN: [id number]  DATE: 2020  TIME: 12:08 PM    Assessment and Plan   Infective urethritis [N34 2]  1  Infective urethritis  amoxicillin-clavulanate (AUGMENTIN) 875-125 mg per tablet    POCT urine dip    Urine culture   2  Acute non-recurrent pansinusitis  amoxicillin-clavulanate (AUGMENTIN) 875-125 mg per tablet         Patient Instructions     Antibiotic as directed until completed  Motrin and/or Tylenol as needed for fevers aches and pains  Drink plenty of fluids stay well hydrated  Continue home medications as prescribed  Follow up with PCP in 3-5 days  Proceed to  ER if symptoms worsen  Chief Complaint     Chief Complaint   Patient presents with    Possible UTI     past 2 days frequency lower back pain    Sinusitis     sinus headache and sinus pressure past 2 days         History of Present Illness       54-year-old female presents with 2 complaints  Patient reports she has been having UTI symptoms for the past 2 days  Frequency urgency  Denies any hematuria  No abdominal pain nausea vomiting or diarrhea  No diarrhea reported  Denies any vaginal discharge  Second complaint is some sinus pain and pressure for the past week  Has been using some Flonase with minimal relief  Denies any fevers or chills  Denies any sore throat or ear pain  Urinary Tract Infection    This is a new problem  The current episode started yesterday  The problem occurs every urination  The problem has been waxing and waning  The quality of the pain is described as burning  The pain is mild  There has been no fever  She is sexually active  There is no history of pyelonephritis  Associated symptoms include frequency and urgency  Pertinent negatives include no discharge, hematuria, hesitancy or vomiting  She has tried increased fluids for the symptoms  The treatment provided no relief  Sinusitis   This is a new problem   The current episode started 1 to 4 weeks ago  The problem has been waxing and waning since onset  There has been no fever  The pain is mild  Associated symptoms include sinus pressure  Pertinent negatives include no congestion, coughing, headaches, hoarse voice or sore throat  Past treatments include saline sprays, spray decongestants and lying down  The treatment provided no relief  Review of Systems   Review of Systems   Constitutional: Negative  HENT: Positive for rhinorrhea, sinus pressure and sinus pain  Negative for congestion, hoarse voice and sore throat  Eyes: Negative  Respiratory: Negative  Negative for cough  Cardiovascular: Negative  Gastrointestinal: Negative  Negative for vomiting  Genitourinary: Positive for dysuria, frequency and urgency  Negative for hematuria and hesitancy  Musculoskeletal: Negative  Skin: Negative  Neurological: Negative  Negative for headaches           Current Medications       Current Outpatient Medications:     amitriptyline (ELAVIL) 10 mg tablet, , Disp: , Rfl:     carisoprodol (SOMA) 350 mg tablet, every 6 (six) hours as needed , Disp: , Rfl:     Cholecalciferol (VITAMIN D3) 125 MCG (5000 UT) CAPS, Take 1 capsule by mouth daily, Disp: , Rfl:     clonazePAM (KlonoPIN) 1 mg tablet, , Disp: , Rfl:     FLUoxetine (PROzac) 40 MG capsule, TAKE TWO CAPSULES BY MOUTH ONCE EVERY DAY, Disp: , Rfl:     hydroxychloroquine (PLAQUENIL) 200 mg tablet, , Disp: , Rfl:     liothyronine (CYTOMEL) 5 mcg tablet, , Disp: , Rfl:     methylphenidate (METADATE ER) 10 MG ER tablet, TAKE ONE TABLET BY MOUTH TWICE DAILY IN THE MORNING AND IN THE AFTERNOON, Disp: , Rfl:     Multiple Vitamins-Minerals (MULTIVITAMIN ADULT PO), 1 daily, Disp: , Rfl:     nystatin (MYCOSTATIN) ointment, Apply topically 2 (two) times a day, Disp: 30 g, Rfl: 0    OMEGA-3 FATTY ACIDS PO, Take 1 capsule by mouth daily, Disp: , Rfl:     saccharomyces boulardii (FLORASTOR) 250 mg capsule, 1 daily, Disp: , Rfl:     temazepam (RESTORIL) 30 mg capsule, temazepam 30 mg capsule, Disp: , Rfl:     traMADol (ULTRAM) 50 mg tablet, Take 1 tablet (50 mg total) by mouth every 8 (eight) hours as needed for moderate pain, Disp: 15 tablet, Rfl: 0    amoxicillin-clavulanate (AUGMENTIN) 875-125 mg per tablet, Take 1 tablet by mouth every 12 (twelve) hours for 7 days, Disp: 14 tablet, Rfl: 0    Current Allergies     Allergies as of 09/06/2020 - Reviewed 09/06/2020   Allergen Reaction Noted    Androgens  09/09/2019    Sulfa antibiotics Other (See Comments) 06/05/2015            The following portions of the patient's history were reviewed and updated as appropriate: allergies, current medications, past family history, past medical history, past social history, past surgical history and problem list      Past Medical History:   Diagnosis Date    Disease of thyroid gland     Fibromyalgia     [facility]up[facility] ([facility] [address] [facility]     Proctitis     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[address] [facility]     Vitiligo        Past Surgical History:   Procedure Laterality Date    ADENOIDECTOMY      APPENDECTOMY      CHOLECYSTECTOMY      OVARIAN CYST SURGERY      TONSILLECTOMY         History reviewed  No pertinent family history  Medications have been verified  Objective   /56 (BP Location: Right arm, Patient Position: Sitting)   Pulse 79   Temp (!) 97 2 °F (36 2 °C) (Tympanic)   Resp 18   Ht 5' 4" (1 626 m)   Wt 63 7 kg (140 lb 6 4 oz)   SpO2 99%   BMI 24 10 kg/m²        Physical Exam     Physical Exam  Vitals signs and nursing note reviewed  Constitutional:       General: She is not in acute distress  Appearance: She is well-developed  HENT:      Head: Normocephalic and atraumatic        Right Ear: Hearing, tympanic membrane, ear canal and external ear normal       Left Ear: Hearing, tympanic membrane, ear canal and external ear normal       Nose:      Right Sinus: Maxillary sinus tenderness and frontal sinus tenderness present  Left Sinus: Maxillary sinus tenderness and frontal sinus tenderness present  Comments: Tenderness with percussion to sinus areas     Mouth/Throat:      Pharynx: Uvula midline  No oropharyngeal exudate  Eyes:      General:         Right eye: No discharge  Left eye: No discharge  Conjunctiva/sclera: Conjunctivae normal    Neck:      Musculoskeletal: Normal range of motion and neck supple  Cardiovascular:      Rate and Rhythm: Normal rate and regular rhythm  Heart sounds: Normal heart sounds  No murmur  Pulmonary:      Effort: Pulmonary effort is normal  No respiratory distress  Breath sounds: Normal breath sounds  No wheezing or rales  Abdominal:      General: Bowel sounds are normal       Palpations: Abdomen is soft  Tenderness: There is no abdominal tenderness  Musculoskeletal: Normal range of motion  Lymphadenopathy:      Cervical: No cervical adenopathy  Skin:     General: Skin is warm and dry  Neurological:      Mental Status: She is alert and oriented to person, place, and time

## 2020-09-07 LAB — BACTERIA UR CULT: NORMAL

## 2020-09-12 ENCOUNTER — OFFICE VISIT (OUTPATIENT)
Dept: OBGYN CLINIC | Facility: MEDICAL CENTER | Age: 55
End: 2020-09-12
Payer: COMMERCIAL

## 2020-09-12 ENCOUNTER — APPOINTMENT (OUTPATIENT)
Dept: RADIOLOGY | Facility: MEDICAL CENTER | Age: 55
End: 2020-09-12
Payer: COMMERCIAL

## 2020-09-12 VITALS — BODY MASS INDEX: 24.03 KG/M2 | WEIGHT: 140 LBS | DIASTOLIC BLOOD PRESSURE: 84 MMHG | SYSTOLIC BLOOD PRESSURE: 133 MMHG

## 2020-09-12 DIAGNOSIS — M25.572 PAIN, JOINT, ANKLE AND FOOT, LEFT: ICD-10-CM

## 2020-09-12 DIAGNOSIS — S82.65XD CLOSED NONDISPLACED FRACTURE OF LATERAL MALLEOLUS OF LEFT FIBULA WITH ROUTINE HEALING, SUBSEQUENT ENCOUNTER: Primary | ICD-10-CM

## 2020-09-12 PROCEDURE — 73610 X-RAY EXAM OF ANKLE: CPT

## 2020-09-12 PROCEDURE — 99213 OFFICE O/P EST LOW 20 MIN: CPT | Performed by: FAMILY MEDICINE

## 2020-09-12 NOTE — PATIENT INSTRUCTIONS
Transition to normal footwear and activities over the next 4 weeks including walking   May wear lace up ankle brace during high risk activities such as hiking, walking uneven surfaces, sports, running for up to 1 year    I recommended gradual return to running to include 1 weeks of walking 100%, 1 week of 50% walking/50% jogging 1 mile, followed by 1 week of 50% jogging/50% running 1 mile, followed by 1 week of 100% running 1 mile if pain free  At 4th week, I recommend increasing mileage at a slow pace

## 2020-09-12 NOTE — PROGRESS NOTES
1  Closed nondisplaced fracture of lateral malleolus of left fibula with routine healing, subsequent encounter     2  Pain, joint, ankle and foot, left  XR ankle 3+ vw left     Orders Placed This Encounter   Procedures    XR ankle 3+ vw left        Imaging Studies (I personally reviewed images in PACS and report):  X-ray left ankle 09/12/2020:  Stable alignment nondisplaced distal fibular Cordova a fracture with interval healing and almost complete obscuration of fracture line    Past Diagnostics:  X-ray left ankle 07/15/2020:  Stable alignment nondisplaced distal fibular Cordova a fracture with interval healing and sclerosis but without complete obliteration of fracture line    IMPRESSION:  Left Ankle Cordova A Nondisplaced Distal Fibular Fracture  DOI: 6/7/20  FUI: 13 weeks 6 days      Repeat X-ray next visit:        Return if symptoms worsen or fail to improve  Patient Instructions   Transition to normal footwear and activities over the next 4 weeks including walking   May wear lace up ankle brace during high risk activities such as hiking, walking uneven surfaces, sports, running for up to 1 year    I recommended gradual return to running to include 1 weeks of walking 100%, 1 week of 50% walking/50% jogging 1 mile, followed by 1 week of 50% jogging/50% running 1 mile, followed by 1 week of 100% running 1 mile if pain free  At 4th week, I recommend increasing mileage at a slow pace  CHIEF COMPLAINT:  Follow-up left ankle fracture    HPI:  Fouzia Smiley is a 54 y o  female  who presents for     08/05/2020: Follow-up left ankle fracture:  Improving  Overall patient feels approximately 95% improved  She has continued use her controlled ankle motion boot with no pain  09/12/2020: Follow-up left ankle fracture:  Significantly improved  90% improved overall  Denies any pain when walking  She has transition to lace-up ankle brace without any difficulty    She did attempt to walk 3 miles 1 day last month after she was significant improved with physical therapy but did have some swelling and pain next day  Since then she has not had any pain at but has not return to walking  She does perform home exercise program and does go to physical therapy  Review of Systems   Constitutional: Negative for chills and fever  Neurological: Negative for weakness and numbness  Following history reviewed and update:    Past Medical History:   Diagnosis Date    Disease of thyroid gland     Fibromyalgia     Lupus ([address] )     Proctitis     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HCC)     Vitiligo      Past Surgical History:   Procedure Laterality Date    ADENOIDECTOMY      APPENDECTOMY      CHOLECYSTECTOMY      OVARIAN CYST SURGERY      TONSILLECTOMY       Social History   Social History     Substance and Sexual Activity   Alcohol Use Not Currently     Social History     Substance and Sexual Activity   Drug Use Never     Social History     Tobacco Use   Smoking Status Never Smoker   Smokeless Tobacco Never Used     History reviewed  No pertinent family history  Allergies   Allergen Reactions    Androgens      Other reaction(s): Vomiting    Sulfa Antibiotics Other (See Comments)     Hives/Urticaria -Erythema Multiforme          Physical Exam  /84   Wt 63 5 kg (140 lb)   BMI 24 03 kg/m²     Constitutional:  see vital signs  Gen: well-developed, normocephalic/atraumatic, well-groomed  Eyes: No inflammation or discharge of conjunctiva or lids; sclera clear   Pharynx: no inflammation, lesion, or mass of lips  Neck: supple, no masses, non-distended  MSK: no inflammation, lesion, mass, or clubbing of nails and digits except for other than mentioned below  SKIN: no visible rashes or skin lesions  Pulmonary/Chest: Effort normal  No respiratory distress     NEURO: cranial nerves grossly intact  PSYCH:  Alert and oriented to person, place, and time; recent and remote memory intact; mood normal, no depression, anxiety, or agitation, judgment and insight good and intact      Ortho Exam  LEFT ANKLE  EXAM  Observation  GAIT:  normal    Inspection  Erythema: no  Ecchymosis: no  Edema:  none    Tenderness  Proximal Fibula: no  ([place] frx)  AiTFL: no  (2cm proximal-medial to tip lateral malleolus 92% sens, 29% spec)  ATFL: no  CFL: no  PTFL: no  Achilles:  no  deltoid: No  Peroneal: no  Tibialis Anterior: no  Tibialis Posterior: no     Bony Tenderpoints:  Lateral Malleolus: no  Base of 5th MT: no  Medial Malleolus: no  Navicular: no  Talar dome: No    ROM  Dorsiflexion: intact  Plantarflexion: intact    Muscle Strength  Pronation: intact without pain  Supination: intact without pain    Tib-Fib Squeeze: negative  ([id number] squeeze; 26% sens, 88% spec; rule in test)    Calcaneal Squeeze: negative    Dorsiflexion (+) ER Stress Test: negative  (reproduce ATiFL mech; 71% sens, 63% spec)          Procedures

## 2020-10-10 ENCOUNTER — AMB VIDEO VISIT (OUTPATIENT)
Dept: OTHER | Facility: HOSPITAL | Age: 55
End: 2020-10-10

## 2020-10-10 PROCEDURE — EVISIT: Performed by: FAMILY MEDICINE

## 2020-11-20 ENCOUNTER — OFFICE VISIT (OUTPATIENT)
Dept: URGENT CARE | Age: 55
End: 2020-11-20
Payer: COMMERCIAL

## 2020-11-20 VITALS
WEIGHT: 130 LBS | HEIGHT: 63 IN | OXYGEN SATURATION: 98 % | RESPIRATION RATE: 18 BRPM | HEART RATE: 62 BPM | BODY MASS INDEX: 23.04 KG/M2 | TEMPERATURE: 97.1 F

## 2020-11-20 DIAGNOSIS — J32.9 SINUSITIS, UNSPECIFIED CHRONICITY, UNSPECIFIED LOCATION: ICD-10-CM

## 2020-11-20 DIAGNOSIS — N39.0 URINARY TRACT INFECTION WITHOUT HEMATURIA, SITE UNSPECIFIED: Primary | ICD-10-CM

## 2020-11-20 DIAGNOSIS — Z20.822 ENCOUNTER FOR LABORATORY TESTING FOR COVID-19 VIRUS: ICD-10-CM

## 2020-11-20 LAB
SL AMB  POCT GLUCOSE, UA: NEGATIVE
SL AMB LEUKOCYTE ESTERASE,UA: ABNORMAL
SL AMB POCT BILIRUBIN,UA: NEGATIVE
SL AMB POCT BLOOD,UA: NEGATIVE
SL AMB POCT CLARITY,UA: CLEAR
SL AMB POCT COLOR,UA: ABNORMAL
SL AMB POCT KETONES,UA: NEGATIVE
SL AMB POCT NITRITE,UA: NEGATIVE
SL AMB POCT PH,UA: 7.5
SL AMB POCT SPECIFIC GRAVITY,UA: 1
SL AMB POCT URINE PROTEIN: NEGATIVE
SL AMB POCT UROBILINOGEN: 0.2

## 2020-11-20 PROCEDURE — 81002 URINALYSIS NONAUTO W/O SCOPE: CPT | Performed by: PHYSICIAN ASSISTANT

## 2020-11-20 PROCEDURE — 99213 OFFICE O/P EST LOW 20 MIN: CPT | Performed by: PHYSICIAN ASSISTANT

## 2020-11-20 PROCEDURE — S9088 SERVICES PROVIDED IN URGENT: HCPCS | Performed by: PHYSICIAN ASSISTANT

## 2020-11-20 PROCEDURE — 87086 URINE CULTURE/COLONY COUNT: CPT | Performed by: PHYSICIAN ASSISTANT

## 2020-11-20 PROCEDURE — U0003 INFECTIOUS AGENT DETECTION BY NUCLEIC ACID (DNA OR RNA); SEVERE ACUTE RESPIRATORY SYNDROME CORONAVIRUS 2 (SARS-COV-2) (CORONAVIRUS DISEASE [COVID-19]), AMPLIFIED PROBE TECHNIQUE, MAKING USE OF HIGH THROUGHPUT TECHNOLOGIES AS DESCRIBED BY CMS-2020-01-R: HCPCS | Performed by: PHYSICIAN ASSISTANT

## 2020-11-20 RX ORDER — LEVOFLOXACIN 500 MG/1
500 TABLET, FILM COATED ORAL EVERY 24 HOURS
Qty: 7 TABLET | Refills: 0 | Status: SHIPPED | OUTPATIENT
Start: 2020-11-20 | End: 2020-11-27

## 2020-11-21 LAB — BACTERIA UR CULT: NORMAL

## 2020-11-22 LAB — SARS-COV-2 RNA SPEC QL NAA+PROBE: NOT DETECTED

## 2020-11-27 ENCOUNTER — OFFICE VISIT (OUTPATIENT)
Dept: URGENT CARE | Age: 55
End: 2020-11-27
Payer: COMMERCIAL

## 2020-11-27 VITALS
BODY MASS INDEX: 23.04 KG/M2 | HEART RATE: 80 BPM | OXYGEN SATURATION: 98 % | HEIGHT: 63 IN | WEIGHT: 130 LBS | RESPIRATION RATE: 18 BRPM | TEMPERATURE: 96.9 F

## 2020-11-27 DIAGNOSIS — J01.90 ACUTE SINUSITIS, RECURRENCE NOT SPECIFIED, UNSPECIFIED LOCATION: ICD-10-CM

## 2020-11-27 DIAGNOSIS — R39.9 UTI SYMPTOMS: Primary | ICD-10-CM

## 2020-11-27 LAB
SL AMB  POCT GLUCOSE, UA: ABNORMAL
SL AMB LEUKOCYTE ESTERASE,UA: ABNORMAL
SL AMB POCT BILIRUBIN,UA: ABNORMAL
SL AMB POCT BLOOD,UA: ABNORMAL
SL AMB POCT CLARITY,UA: ABNORMAL
SL AMB POCT COLOR,UA: ABNORMAL
SL AMB POCT KETONES,UA: ABNORMAL
SL AMB POCT NITRITE,UA: ABNORMAL
SL AMB POCT PH,UA: 5
SL AMB POCT SPECIFIC GRAVITY,UA: 1
SL AMB POCT URINE PROTEIN: ABNORMAL
SL AMB POCT UROBILINOGEN: 0.2

## 2020-11-27 PROCEDURE — S9088 SERVICES PROVIDED IN URGENT: HCPCS | Performed by: PHYSICIAN ASSISTANT

## 2020-11-27 PROCEDURE — 81002 URINALYSIS NONAUTO W/O SCOPE: CPT | Performed by: PHYSICIAN ASSISTANT

## 2020-11-27 PROCEDURE — 99213 OFFICE O/P EST LOW 20 MIN: CPT | Performed by: PHYSICIAN ASSISTANT

## 2020-11-27 PROCEDURE — 87086 URINE CULTURE/COLONY COUNT: CPT | Performed by: PHYSICIAN ASSISTANT

## 2020-11-27 RX ORDER — CIPROFLOXACIN 500 MG/1
500 TABLET, FILM COATED ORAL EVERY 12 HOURS SCHEDULED
Qty: 10 TABLET | Refills: 0 | Status: SHIPPED | OUTPATIENT
Start: 2020-11-27 | End: 2020-12-02

## 2020-11-28 LAB — BACTERIA UR CULT: NORMAL

## 2020-12-04 ENCOUNTER — OFFICE VISIT (OUTPATIENT)
Dept: URGENT CARE | Age: 55
End: 2020-12-04
Payer: COMMERCIAL

## 2020-12-04 VITALS
RESPIRATION RATE: 18 BRPM | TEMPERATURE: 98.4 F | SYSTOLIC BLOOD PRESSURE: 128 MMHG | OXYGEN SATURATION: 96 % | DIASTOLIC BLOOD PRESSURE: 80 MMHG | HEART RATE: 85 BPM

## 2020-12-04 DIAGNOSIS — R30.0 DYSURIA: Primary | ICD-10-CM

## 2020-12-04 LAB
SL AMB  POCT GLUCOSE, UA: NORMAL
SL AMB LEUKOCYTE ESTERASE,UA: NORMAL
SL AMB POCT BILIRUBIN,UA: NORMAL
SL AMB POCT BLOOD,UA: NORMAL
SL AMB POCT CLARITY,UA: CLEAR
SL AMB POCT COLOR,UA: YELLOW
SL AMB POCT KETONES,UA: NORMAL
SL AMB POCT NITRITE,UA: NORMAL
SL AMB POCT PH,UA: 6
SL AMB POCT SPECIFIC GRAVITY,UA: 1.03
SL AMB POCT URINE PROTEIN: NORMAL
SL AMB POCT UROBILINOGEN: 0.2

## 2020-12-04 PROCEDURE — 99213 OFFICE O/P EST LOW 20 MIN: CPT | Performed by: PHYSICIAN ASSISTANT

## 2020-12-04 PROCEDURE — 81002 URINALYSIS NONAUTO W/O SCOPE: CPT | Performed by: PHYSICIAN ASSISTANT

## 2020-12-04 PROCEDURE — 87086 URINE CULTURE/COLONY COUNT: CPT | Performed by: PHYSICIAN ASSISTANT

## 2020-12-04 PROCEDURE — S9088 SERVICES PROVIDED IN URGENT: HCPCS | Performed by: PHYSICIAN ASSISTANT

## 2020-12-04 RX ORDER — PHENAZOPYRIDINE HYDROCHLORIDE 200 MG/1
200 TABLET, FILM COATED ORAL
Qty: 10 TABLET | Refills: 0 | Status: SHIPPED | OUTPATIENT
Start: 2020-12-04

## 2020-12-05 LAB — BACTERIA UR CULT: NORMAL

## 2021-02-21 ENCOUNTER — AMB VIDEO VISIT (OUTPATIENT)
Dept: OTHER | Facility: HOSPITAL | Age: 56
End: 2021-02-21

## 2021-02-21 PROCEDURE — ECARE PR SL URGENT CARE VIRTUAL VISIT: Performed by: FAMILY MEDICINE

## 2021-02-21 NOTE — CARE ANYWHERE EVISITS
Visit Summary for Opal Fernandes - Gender: Female - Date of Birth: [id number]  Date: [id number] - Duration: 5 minutes  Patient: Opal Fernandes  Provider: Jannet Serrano    Patient Contact Information  Address  [address]; [address]  [phone]    Visit Topics  Sinus pain  [Added By: Self - 2021-02-21]    Triage Questions   What is your current physical address in the event of a medical emergency? Answer []  Are you allergic to any medications? Answer []  Are you now or could you be pregnant? Answer []  Do you have any immune system compromise or chronic lung   disease? Answer []  Do you have any vulnerable family members in the home (infant, pregnant, cancer, elderly)? Answer []     Conversation Transcripts  [0A][0A] [Notification] You are connected with Jannet Serrano, Family Physician [0A][Notification] TAY Perez is located in [place]  [0A][Notification] Opal Fernandes has shared health history  Stacey Girma  [0A][Notification] Jannet Serrano has added   a diagnosis/procedure code  [0A][Notification] Jannet Serrano has added a diagnosis/procedure code  [0A][Notification] Jannet Serrano has added a diagnosis/procedure code  [0A][Notification] Jannet Serrano has added a prescription  [0A]    Diagnosis  Urinary tract infection, site not specified  Other acute sinusitis    Procedures  Value: [id number] Code: CPT-4 OL DIG E/M SVC 11-20 MIN    Medications Prescribed    cephalexin  Dose : 1 capsule  Route : oral  Frequency : 4 times a day  Refills : 0  Instructions to the Pharmacist : Substitutions allowed      Provider Notes  [0A][0A] Video visit[0A][0A][0A][0A]S: Dysuria and frequency of urination for 5 days  [0A][0A]No fever, nausea, vomiting or flank pain  No vaginal discharge or rash  [0A][0A]No abnormal vaginal bleeding [0A][0A]No history of UTI in the last 6   months  [0A]Stuffy nose and sinus congestion for several days and now with severe sinus headache and pressure    Has post nasal drainage  No cough  [0A][0A]PMH: lupus[0A][0A][0A][0A]Meds: plaquenil[0A][0A][0A][0A]Allergies: sulfa [0A][0A]O: Alert, in no   apparent distress  Looks comfortable  Answers questions appropriately  [0A][0A]Talking and breathing normally, no shortness of breath  [0A][0A]No CVAT  Abdomen soft, non tender [0A][0A][0A][0A]A: Urinary Tract Infection[0A]Sinusitis [0A][0A][0A][0A]P:   Cephalexin [0A]Flonase [0A][0A]OTC AZO or Uristat for the discomfort with urination  It may turn your urine bright orange [0A][0A]Follow up with your primary care provider, schedule another online visit, or go to the emergency department if you develop   a fever, nausea, vomiting, back or flank pain, or if your symptoms change, persist or worsen  If there is no improvement in 1-2 days, please follow up for an in person office visit to get provide a urine sample to send for culture  [0A][0A]Please call   the pharmacy hotline at [phone] if you have any questions about your prescription [0A][0A][0A][0A]I recommend that you have an in-person exam with your primary care provider annually  [0A][0A]I recommend that you share a copy of this visit with your   primary care provider to be included in your medical records  [0A]    Electronically signed byEfren Maldonado(NPI [id number])

## 2021-03-18 ENCOUNTER — AMB VIDEO VISIT (OUTPATIENT)
Dept: OTHER | Facility: HOSPITAL | Age: 56
End: 2021-03-18

## 2021-03-18 PROCEDURE — ECARE PR SL URGENT CARE VIRTUAL VISIT: Performed by: FAMILY MEDICINE

## 2021-03-18 NOTE — CARE ANYWHERE EVISITS
Visit Summary for aKiley Woodall - Gender: Female - Date of Birth: [id number]  Date: [id number] - Duration: 11 minutes  Patient: Kailey Woodall  Provider: Robert Blue    Patient Contact Information  Address  [address]; [address]  [phone]    Visit Topics  Bladder infection, ear pain sinus body ache  [Added By: Self - 2021]    Triage Questions   What is your current physical address in the event of a medical emergency? Answer []  Are you allergic to any medications? Answer []  Are you now or could you be pregnant? Answer []  Do you have any immune system compromise or chronic lung   disease? Answer []  Do you have any vulnerable family members in the home (infant, pregnant, cancer, elderly)? Answer []     Conversation Transcripts  [0A][0A] [Notification] You are connected with Robert Blue, Family Physician [0A][Notification] TAY Kent is located in [place]  [0A][Notification] Kailey Woodall has shared health history  Dartani Pac  [0A][Notification] Robert Blue has added a   diagnosis/procedure code  [0A][Notification] Robert Blue has added a diagnosis/procedure code  [0A][Notification] Robert Blue has deleted a diagnosis/procedure code  [0A][Notification] Robert Blue has added a prescription [0A][Notification]   Robert Blue has added a prescription  [0A]    Diagnosis  Acute sinusitis, unspecified    Procedures    Medications Prescribed    Flonase Allergy Relief  Dose : 2 sprays  Route : nasal  Frequency : every day  Until directed to stop  Patient Instructions : each nostril  Refills : 0  Instructions to the Pharmacist : Substitutions allowed    amoxicillin  Dose : 1 tablet  Route : oral  Frequency : every 12 hours  Until directed to stop       Refills : 0  Instructions to the Pharmacist : Substitutions allowed      Provider Notes  [0A][0A] pa, confirmed :  1965[0A]webcam[0A]TOPICS:Bladder infection, ear pain sinus body ache[0A]began  4 days ago, worsening[0A]head cold, sinus pain + pressure worsening, severe nasal congestion in head, PND thick mucus  sore throat, slight   cough, no sob, no wheeze or chest discomfort  no F, No chills, sweats BA  rx;  mucinex, tylneol flu, flonase mild temp relief  [0A]no covid exposureno change taste or smell+NLOA[0A]urinary sx, last uti 1 mo ago, pat agrees to please seek in person   care[0A]NAD, HA and sinus pressure w/ nasal congestion, worsening, severe RAD to cheeks fh under eye and ears,   severe, sore throat, ln ttp, breathing and speaking comfortably[0A]ALLERGIES: sulfa[0A]DAILY MEDS/SUPPLEMENTS: plaquenil since 2008[0A]PMH:   SLE, AR[0A]normal kidney and liver function[0A]SHX:[0A]not pregnant or bf[0A]Diagnosis:[0A]sinusitis, acute: allergic vs viral vs bacterial, most likely[0A]uti symptoms, last uti 1 mo ago, patient agrees to please seek in person care with pcp or urgent   care to please test urine and culture prior to starting antibioitcs[0A]SLE[0A]Treatment:[0A]rest[0A]steam inhalation[0A]nasal saline as directed as needed[0A]salt gargles[0A]increase fluid intake[0A]humidifier[0A]flonase, 2 jetts each nostril   daily[0A]tylenol  or ibuprofen as directed as needed for fever or body aches, stop if any stomach upset[0A]amoxicllin, as directed, please complete entire course[0A]Daily yogurt or probioitc for at least 3-6 months to replace the digestive bacteria lost   to antibiotic use  [0A]ALL antibioitcs can increase your risk for a yeast infection, may take any over the counter anti fungal regimen as directed as needed or may reconnect for other treatment options  [0A]Patient agrees to please follow up with your   Primary Care Physician for full evaluation, and additional treatment if not improving or worsening   Patient understands the need for an in person exam and ear evaluation by otoscope and agrees to follow up with her PCP or Urgent Care or ER if not   improving or worsening  [0A]Please reconnect or send a secure message anytime, as needed  [0A]Please send me a secure message (click the envelope on the right side of the screen) letting me know how you are doing in 3-4 days, or after the treatment has   been completed  [0A] [0A]Thank you for using [facility]  It was a pleasure speaking with you  I look forward to seeing you again for any future medical needs  [0A] [0A]Please print a copy of this note and send it to your regular doctor, or take it to   your next visit so it may be included in your medical record  [0A] [0A]If you need to speak to customer support, please call the following number: [0A][employer]: [phone]  [0A] [0A]And for pharmacy related issues, the number is   [phone]  [0A]Thanks for consulting with me, I hope you will be feeling better soon! [0A]    Electronically signed by:  Elizabeth Hoskins(NPI [id number])

## 2021-03-23 ENCOUNTER — OFFICE VISIT (OUTPATIENT)
Dept: URGENT CARE | Age: 56
End: 2021-03-23
Payer: COMMERCIAL

## 2021-03-23 VITALS
HEIGHT: 63 IN | HEART RATE: 85 BPM | TEMPERATURE: 97.5 F | OXYGEN SATURATION: 96 % | WEIGHT: 129 LBS | BODY MASS INDEX: 22.86 KG/M2

## 2021-03-23 DIAGNOSIS — B34.9 VIRAL SYNDROME: ICD-10-CM

## 2021-03-23 DIAGNOSIS — Z11.59 SPECIAL SCREENING EXAMINATION FOR VIRAL DISEASE: Primary | ICD-10-CM

## 2021-03-23 PROCEDURE — 99213 OFFICE O/P EST LOW 20 MIN: CPT | Performed by: PHYSICIAN ASSISTANT

## 2021-03-23 PROCEDURE — U0003 INFECTIOUS AGENT DETECTION BY NUCLEIC ACID (DNA OR RNA); SEVERE ACUTE RESPIRATORY SYNDROME CORONAVIRUS 2 (SARS-COV-2) (CORONAVIRUS DISEASE [COVID-19]), AMPLIFIED PROBE TECHNIQUE, MAKING USE OF HIGH THROUGHPUT TECHNOLOGIES AS DESCRIBED BY CMS-2020-01-R: HCPCS | Performed by: PHYSICIAN ASSISTANT

## 2021-03-23 PROCEDURE — U0005 INFEC AGEN DETEC AMPLI PROBE: HCPCS | Performed by: PHYSICIAN ASSISTANT

## 2021-03-23 RX ORDER — TRAZODONE HYDROCHLORIDE 50 MG/1
TABLET ORAL
COMMUNITY
Start: 2021-01-08

## 2021-03-23 RX ORDER — AMOXICILLIN 875 MG/1
TABLET, COATED ORAL
COMMUNITY
Start: 2021-03-18

## 2021-03-23 RX ORDER — HYDROXYCHLOROQUINE SULFATE 200 MG/1
TABLET, FILM COATED ORAL
COMMUNITY

## 2021-03-23 RX ORDER — FLUOXETINE HYDROCHLORIDE 40 MG/1
CAPSULE ORAL
COMMUNITY

## 2021-03-23 RX ORDER — CLONAZEPAM 1 MG/1
TABLET ORAL
COMMUNITY

## 2021-03-23 RX ORDER — CARISOPRODOL 350 MG/1
350 TABLET ORAL
COMMUNITY
Start: 2021-01-26

## 2021-03-23 RX ORDER — ALPRAZOLAM 0.5 MG/1
TABLET ORAL
COMMUNITY

## 2021-03-23 RX ORDER — TEMAZEPAM 30 MG/1
CAPSULE ORAL
COMMUNITY

## 2021-03-23 NOTE — PATIENT INSTRUCTIONS
Check or sign up for   [facility] my Chart to view your results  We do not call patient's with negative results  Go directly home after today's visit, quarantine until you receive a negative result  If you have a positive result you need to quarantine at home for 10-14 days after the date of symptom onset  If symptoms last longer, wait 72 hours until the resolution of symptoms before ending your quarantine  Recommend Flonase over the counter as directed for nasal congestion  Recommend Vitamin C 1,000 mg twice daily, Vitamin D3 2000 IU daily, multivitamin and Zinc for immune support  If your symptoms worsen or you develop shortness of breath report to the nearest emergency room  Check cdc gov for most current guidelines as guidelines are subject to change as we learn more about the virus  [address]    Your healthcare provider and/or public health staff have evaluated you and have determined that you do not need to remain in the hospital at this time  At this time you can be isolated at home where you will be monitored by staff from your local or state health department  You should carefully follow the prevention and isolation steps below until a healthcare provider or local or state health department says that you can return to your normal activities  Stay home except to get medical care    People who are mildly ill with COVID-19 are able to isolate at home during their illness  You should restrict activities outside your home, except for getting medical care  Do not go to work, school, or public areas  Avoid using public transportation, ride-sharing, or taxis  Separate yourself from other people and animals in your home    People: As much as possible, you should stay in a specific room and away from other people in your home  Also, you should use a separate bathroom, if available  Animals:  You should restrict contact with pets and other animals while you are sick with COVID-19, just like you would around other people  Although there have not been reports of pets or other animals becoming sick with COVID-19, it is still recommended that people sick with COVID-19 limit contact with animals until more information is known about the virus  When possible, have another member of your household care for your animals while you are sick  If you are sick with COVID-19, avoid contact with your pet, including petting, snuggling, being kissed or licked, and sharing food  If you must care for your pet or be around animals while you are sick, wash your hands before and after you interact with pets and wear a facemask  See COVID-19 and Animals for more information  Call ahead before visiting your doctor    If you have a medical appointment, call the healthcare provider and tell them that you have or may have COVID-19  This will help the healthcare providers office take steps to keep other people from getting infected or exposed  Wear a facemask    You should wear a facemask when you are around other people (e g , sharing a room or vehicle) or pets and before you enter a healthcare providers office  If you are not able to wear a facemask (for example, because it causes trouble breathing), then people who live with you should not stay in the same room with you, or they should wear a facemask if they enter your room  Cover your coughs and sneezes    Cover your mouth and nose with a tissue when you cough or sneeze  Throw used tissues in a lined trash can  Immediately wash your hands with soap and water for at least 20 seconds or, if soap and water are not available, clean your hands with an alcohol-based hand  that contains at least 60% alcohol  Clean your hands often    Wash your hands often with soap and water for at least 20 seconds, especially after blowing your nose, coughing, or sneezing; going to the bathroom; and before eating or preparing food   If soap and water are not readily available, use an alcohol-based hand  with at least 60% alcohol, covering all surfaces of your hands and rubbing them together until they feel dry  Soap and water are the best option if hands are visibly dirty  Avoid touching your eyes, nose, and mouth with unwashed hands  Avoid sharing personal household items    You should not share dishes, drinking glasses, cups, eating utensils, towels, or bedding with other people or pets in your home  After using these items, they should be washed thoroughly with soap and water  Clean all high-touch surfaces everyday    High touch surfaces include counters, tabletops, doorknobs, bathroom fixtures, toilets, phones, keyboards, tablets, and bedside tables  Also, clean any surfaces that may have blood, stool, or body fluids on them  Use a household cleaning spray or wipe, according to the label instructions  Labels contain instructions for safe and effective use of the cleaning product including precautions you should take when applying the product, such as wearing gloves and making sure you have good ventilation during use of the product  Monitor your symptoms    Seek prompt medical attention if your illness is worsening (e g , difficulty breathing)  Before seeking care, call your healthcare provider and tell them that you have, or are being evaluated for, COVID-19  Put on a facemask before you enter the facility  These steps will help the healthcare providers office to keep other people in the office or waiting room from getting infected or exposed  Ask your healthcare provider to call the local or state health department  Persons who are placed under active monitoring or facilitated self-monitoring should follow instructions provided by their local health department or occupational health professionals, as appropriate    If you have a medical emergency and need to call 911, notify the dispatch personnel that you have, or are being evaluated for COVID-19  If possible, put on a facemask before emergency medical services arrive  Discontinuing home isolation    Patients with confirmed COVID-19 should remain under home isolation precautions until the following conditions are met:   - They have had no fever for at least 24 hours (that is one full day of no fever without the use medicine that reduces fevers)  AND  - other symptoms have improved (for example, when their cough or shortness of breath have improved)  AND  - If had mild or moderate illness, at least 10 days have passed since their symptoms first appeared or if severe illness (needed oxygen) or immunosuppressed, at least 20 days have passed since symptoms first appeared  Patients with confirmed COVID-19 should also notify close contacts (including their workplace) and ask that they self-quarantine  Currently, close contact is defined as being within 6 feet for 15 minutes or more from the period 24 hours starting 48 hours before symptom onset to the time at which the patient went into isolation  Close contacts of patients diagnosed with COVID-19 should be instructed by the patient to self-quarantine for 14 days from the last time of their last contact with the patient       Source: [employer]

## 2021-03-23 NOTE — LETTER
March 23, 2021     Patient: Chandana Butcher   YOB: 1965   Date of Visit: 3/23/2021       To Whom It May Concern: It is my medical opinion that Hernan Salma should remain out of work until negative test result  If you have any questions or concerns, please don't hesitate to call           Sincerely,        Najma Dixon PA-C    CC: No Recipients

## 2021-03-23 NOTE — PROGRESS NOTES
[address] [facility]        NAME: Lex Linares is a 54 y o  female  : 1965    MRN: [id number]  DATE: 2021  TIME: 7:04 PM    Assessment and Plan   Special screening examination for viral disease [Z11 59]  1  Special screening examination for viral disease  Novel Coronavirus (Covid-19),PCR [facility] - Office Collection   2  Viral syndrome     Check or sign up for [facility] Chart to view your results  We do not call patient's with negative results  Go directly home after today's visit, quarantine until you receive a negative result  If you have a positive result you need to quarantine at home for 10-14 days after the date of symptom onset  If symptoms last longer, wait 72 hours until the resolution of symptoms before ending your quarantine  Recommend Flonase over the counter as directed for nasal congestion  Recommend Vitamin C 1,000 mg twice daily, Vitamin D3 2000 IU daily, multivitamin and Zinc for immune support  If your symptoms worsen or you develop shortness of breath report to the nearest emergency room  Check cdc gov for most current guidelines as guidelines are subject to change as we learn more about the virus  Patient Instructions       Follow up with PCP in 3-5 days  Proceed to  ER if symptoms worsen  Chief Complaint     Chief Complaint   Patient presents with    COVID-19     Pt started Th with body aches, severe headaches, body aches, chills, SOB and cough  Son with similar sxs  Denies exposure to Covid-19 or receiving vaccine  Pt RX'd Amoxicillin 875mg BID after virtual visit on Friday  States sxs are not improving  History of Present Illness       54year old female presents with complaints of myalgias, headaches, chills, SOB, and cough since Wednesday (2021)  Pt additionally reports sore throat and diarrhea  Pt denies fever,  N/V, and loss of taste and smell   Pt reports her son has had similar symptoms, but has not been tested for COVID 23  Pt denies recent known exposure to COVID-19 and has not travelled out of the state in the last 2 weeks  Pt had video visit with her PCP office on Saturday and was rx'd amoxicillin for possible sinus infection, but has not improved on antibiotics  Pt Pt reports history of lupus and fibromyalgia, no other complaints or concerns at this time  Review of Systems   Review of Systems   HENT: Positive for congestion, postnasal drip, rhinorrhea, sinus pressure, sinus pain and sore throat  Respiratory: Positive for shortness of breath  Negative for cough, chest tightness and wheezing  Cardiovascular: Negative for chest pain, palpitations and leg swelling  Gastrointestinal: Positive for diarrhea  Negative for nausea and vomiting  Musculoskeletal: Positive for myalgias  Allergic/Immunologic: Positive for immunocompromised state (lupus)  Neurological: Positive for headaches           Current Medications       Current Outpatient Medications:     amitriptyline (ELAVIL) 10 mg tablet, , Disp: , Rfl:     carisoprodol (SOMA) 350 mg tablet, every 6 (six) hours as needed , Disp: , Rfl:     carisoprodol (SOMA) 350 mg tablet, Take 350 mg by mouth, Disp: , Rfl:     Cholecalciferol (VITAMIN D3) 125 MCG (5000 UT) CAPS, Take 1 capsule by mouth daily, Disp: , Rfl:     clonazePAM (KlonoPIN) 1 mg tablet, , Disp: , Rfl:     FLUoxetine (PROzac) 40 MG capsule, TAKE TWO CAPSULES BY MOUTH ONCE EVERY DAY, Disp: , Rfl:     hydroxychloroquine (PLAQUENIL) 200 mg tablet, , Disp: , Rfl:     liothyronine (CYTOMEL) 5 mcg tablet, , Disp: , Rfl:     methylphenidate (METADATE ER) 10 MG ER tablet, TAKE ONE TABLET BY MOUTH TWICE DAILY IN THE MORNING AND IN THE AFTERNOON, Disp: , Rfl:     Multiple Vitamins-Minerals (MULTIVITAMIN ADULT PO), 1 daily, Disp: , Rfl:     OMEGA-3 FATTY ACIDS PO, Take 1 capsule by mouth daily, Disp: , Rfl:     phenazopyridine (PYRIDIUM) 200 mg tablet, Take 1 tablet (200 mg total) by mouth 3 (three) times a day with meals, Disp: 10 tablet, Rfl: 0    saccharomyces boulardii (FLORASTOR) 250 mg capsule, 1 daily, Disp: , Rfl:     temazepam (RESTORIL) 30 mg capsule, temazepam 30 mg capsule, Disp: , Rfl:     ALPRAZolam (XANAX) 0 5 mg tablet, alprazolam 0 5 mg tablet, Disp: , Rfl:     amoxicillin (AMOXIL) 875 mg tablet, , Disp: , Rfl:     clonazePAM (KlonoPIN) 1 mg tablet, clonazepam 1 mg tablet, Disp: , Rfl:     FLUoxetine (PROzac) 40 MG capsule, fluoxetine 40 mg capsule, Disp: , Rfl:     hydroxychloroquine (PLAQUENIL) 200 mg tablet, hydroxychloroquine 200 mg tablet, Disp: , Rfl:     nystatin (MYCOSTATIN) ointment, Apply topically 2 (two) times a day (Patient not taking: Reported on 11/20/2020), Disp: 30 g, Rfl: 0    temazepam (RESTORIL) 30 mg capsule, temazepam 30 mg capsule, Disp: , Rfl:     traMADol (ULTRAM) 50 mg tablet, Take 1 tablet (50 mg total) by mouth every 8 (eight) hours as needed for moderate pain (Patient not taking: Reported on 11/20/2020), Disp: 15 tablet, Rfl: 0    traZODone (DESYREL) 50 mg tablet, TAKE 1-2 TABLETS BY MOUTH AT BEDTIME AS NEEDED FOR SLEEP, Disp: , Rfl:     Current Allergies     Allergies as of 03/23/2021 - Reviewed 03/23/2021   Allergen Reaction Noted    Androgens  09/09/2019    Sulfa antibiotics Other (See Comments) 06/05/2015            The following portions of the patient's history were reviewed and updated as appropriate: allergies, current medications, past family history, past medical history, past social history, past surgical history and problem list      Past Medical History:   Diagnosis Date    Disease of thyroid gland     Fibromyalgia     Lupus ([address] )     Proctitis     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[address] )     Vitiligo        Past Surgical History:   Procedure Laterality Date    ADENOIDECTOMY      APPENDECTOMY      CHOLECYSTECTOMY      OVARIAN CYST SURGERY      TONSILLECTOMY         History reviewed  No pertinent family history        Medications have been verified  Objective   Pulse 85   Temp 97 5 °F (36 4 °C)   Ht 5' 3" (1 6 m)   Wt 58 5 kg (129 lb)   SpO2 96%   BMI 22 85 kg/m²   No LMP recorded  Patient is postmenopausal        Physical Exam     Physical Exam  Vitals signs and nursing note reviewed  Constitutional:       General: She is awake  She is not in acute distress  Appearance: Normal appearance  She is well-developed and well-groomed  She is not ill-appearing or toxic-appearing  HENT:      Head: Normocephalic and atraumatic  Right Ear: Hearing, tympanic membrane, ear canal and external ear normal  There is no impacted cerumen  No foreign body  Left Ear: Hearing, tympanic membrane, ear canal and external ear normal  There is no impacted cerumen  No foreign body  Nose: Rhinorrhea present  No septal deviation, mucosal edema or congestion  Rhinorrhea is clear  Right Nostril: No foreign body, epistaxis or occlusion  Left Nostril: No foreign body, epistaxis or occlusion  Right Turbinates: Not enlarged, swollen or pale  Left Turbinates: Not enlarged, swollen or pale  Mouth/Throat:      Lips: Pink  No lesions  Mouth: Mucous membranes are moist  No injury, oral lesions or angioedema  Dentition: Normal dentition  Tongue: No lesions  Tongue does not deviate from midline  Palate: No mass and lesions  Pharynx: Uvula midline  Posterior oropharyngeal erythema present  No pharyngeal swelling, oropharyngeal exudate or uvula swelling  Tonsils: No tonsillar exudate  Eyes:      General: Lids are normal  Vision grossly intact  Gaze aligned appropriately  Extraocular Movements: Extraocular movements intact  Conjunctiva/sclera: Conjunctivae normal    Neck:      Musculoskeletal: Normal range of motion  Cardiovascular:      Rate and Rhythm: Normal rate  Heart sounds: Normal heart sounds, S1 normal and S2 normal  Heart sounds not distant  No murmur  No friction rub   No gallop  Pulmonary:      Effort: Pulmonary effort is normal       Breath sounds: Normal breath sounds  No decreased breath sounds, wheezing, rhonchi or rales  Lymphadenopathy:      Cervical: No cervical adenopathy  Skin:     General: Skin is warm and dry  Neurological:      Mental Status: She is alert and oriented to person, place, and time  Coordination: Coordination is intact  Gait: Gait is intact  Psychiatric:         Attention and Perception: Attention and perception normal          Mood and Affect: Mood and affect normal          Speech: Speech normal          Behavior: Behavior normal  Behavior is cooperative  Thought Content:  Thought content normal

## 2021-03-24 LAB — SARS-COV-2 RNA RESP QL NAA+PROBE: POSITIVE

## 2021-06-03 ENCOUNTER — AMB VIDEO VISIT (OUTPATIENT)
Dept: OTHER | Facility: HOSPITAL | Age: 56
End: 2021-06-03

## 2021-06-03 PROCEDURE — ECARE PR SL URGENT CARE VIRTUAL VISIT: Performed by: FAMILY MEDICINE

## 2021-06-03 NOTE — CARE ANYWHERE EVISITS
Visit Summary for Shivam Verdugo - Gender: Female - Date of Birth: [id number]  Date: [id number] - Duration: 3 minutes  Patient: Shivam Verdugo  Provider: Lucy Rodriguez    Patient Contact Information  Address  [address]; [address]  [phone]    Visit Topics  Sinus infection  [Added By: Self - 2021-06-03]    Triage Questions   What is your current physical address in the event of a medical emergency? Answer []  Are you allergic to any medications? Answer []  Are you now or could you be pregnant? Answer []  Do you have any immune system compromise or chronic lung   disease? Answer []  Do you have any vulnerable family members in the home (infant, pregnant, cancer, elderly)? Answer []     Conversation Transcripts  [0A][0A] [Notification] You are connected with Lucy Rodriguez, Family Physician [0A][Notification] TAY Triplett is located in [address] [place] [address]  [0A][Notification] Vamsipaulino Verdugo has shared health history  Alejandra Tran  [0A][Notification] Lucy Rodriguez has added a   diagnosis/procedure code  [0A]    Diagnosis  Acute sinusitis, unspecified    Procedures    Medications Prescribed    No prescriptions ordered    Provider Notes  [0A][0A] [0A]We strongly encourage you to share the following record of today's visit with your primary care physician  [0A][0A][0A][0A]Contact phone number:[0A][0A][0A][0A]Mode of Communication:  Kale Cleruma [0A][0A]HPI: The patient has been having   sinus pain, post nasal drip, discolored mucous for 2 days only  No fever, no swollen glands or sore throat  There is a cough and no shortness of breath  The patient has been achy and tired  The patient has not had any covid vaccinations   Is going on   vacation on sunday[0A][0A][0A][0A]PMH: lupus, anxiety[0A][0A]Smoking HX: Nonsmoker[0A][0A]Meds: CLONAZEPAM 1 MG [id number] 200 MG [id number] 200 mg [id number] 350 mg   [id number] 350 MG [id number][0A]Allergies: sulfa[0A][0A][0A][0A]PE: [0A][0A]Gen: Patient is well appearing and in no acute distress   [0A][0A]Eyes: Normal appearing[0A][0A]Nose: Congested[0A][0A]Sinuses: Sinus   tenderness bilaterally[0A][0A]Pharynx: Normal appearing tonsils and posterior pharynx[0A][0A]Resp: No respiratory distress[0A][0A][0A][0A]Assessment: Sinusitis   Diagnosis Code: acute sinusitis NOS J01 90 likely viral[0A][0A]  [0A][0A]Plan:    [0A][0A]1  Medication as described below  [0A][0A]2  Discussed options and precautions[0A][0A]3  Recommend nasal saline, Neti Pot, plain Mucinex[0A][0A]4  Sleeping with head/chest elevated can help with sinus drainage5  Recommending covid testing   [0A][0A][0A][0A]Antibiotic indication: only 2 days of symptoms[0A][0A]Antibiotic choice: not indicated[0A][0A][0A][0A]Follow up:[0A][0A]1  Follow up in 7-10 days if not improving  Discussed precautions  [0A][0A]2  If there are any questions or problems   with the prescription, call [phone] anytime for assistance  [0A][0A]3  Please re-connect for another online visit or see an in-person provider should your symptoms worsen or not improving 7-10 days  [0A][0A]4  Taking a probiotic (either in pill   form or by eating yogurt that contains probiotics) while using antibiotics can help prevent some of the troublesome side effects that antibiotics can sometimes cause [0A][0A]5  Please print a copy of this note and send it to your regular doctor, or take   it to your next visit so it may be included in your medical record   [0A][0A][0A][0A]Patient voiced understanding and agrees to plan [0A][0A][0A][0A]Please see your PCP on an annual basis[0A]    Electronically signed by: Aye Saucedo(NPI [id number])

## 2021-09-24 ENCOUNTER — OFFICE VISIT (OUTPATIENT)
Dept: URGENT CARE | Age: 56
End: 2021-09-24
Payer: COMMERCIAL

## 2021-09-24 VITALS
RESPIRATION RATE: 18 BRPM | HEART RATE: 78 BPM | OXYGEN SATURATION: 100 % | TEMPERATURE: 99.8 F | WEIGHT: 129 LBS | BODY MASS INDEX: 22.86 KG/M2 | HEIGHT: 63 IN

## 2021-09-24 DIAGNOSIS — B34.9 ACUTE VIRAL SYNDROME: Primary | ICD-10-CM

## 2021-09-24 DIAGNOSIS — Z11.59 SPECIAL SCREENING EXAMINATION FOR UNSPECIFIED VIRAL DISEASE: ICD-10-CM

## 2021-09-24 PROCEDURE — U0003 INFECTIOUS AGENT DETECTION BY NUCLEIC ACID (DNA OR RNA); SEVERE ACUTE RESPIRATORY SYNDROME CORONAVIRUS 2 (SARS-COV-2) (CORONAVIRUS DISEASE [COVID-19]), AMPLIFIED PROBE TECHNIQUE, MAKING USE OF HIGH THROUGHPUT TECHNOLOGIES AS DESCRIBED BY CMS-2020-01-R: HCPCS | Performed by: PHYSICIAN ASSISTANT

## 2021-09-24 PROCEDURE — 99213 OFFICE O/P EST LOW 20 MIN: CPT | Performed by: PHYSICIAN ASSISTANT

## 2021-09-24 PROCEDURE — U0005 INFEC AGEN DETEC AMPLI PROBE: HCPCS | Performed by: PHYSICIAN ASSISTANT

## 2021-09-24 NOTE — PROGRESS NOTES
[facility]        NAME: Yoly Joseph is a 64 y o  female  : 1965    MRN: [id number]  DATE: 2021  TIME: 10:44 AM    Assessment and Plan   Acute viral syndrome [B34 9]  1  Acute viral syndrome  Novel Coronavirus (Covid-19),PCR [facility]   2  Special screening examination for unspecified viral disease     Pt presents with symptoms consistent with possible COVID 19 infection  Pt will be tested in accordance with CDC guidelines and recommendations for symptomatic individuals regardless of vaccination status  We discussed quarantine protocols and symptomatic treatments  The pt may follow-up with their PCP if symptoms are not improved in 2-3 days and COVID test is negative  Pt will report to the emergency department if symptoms worsen  Patient Instructions   Patient Instructions   Check or sign up for [facility] my Chart to view your results  We do not call patient's with negative results  Go directly home after today's visit, quarantine until you receive a negative result  If you have a positive result you need to quarantine at home for a minimum of 10 days from the date of testing  You may end your quarantine when you are symptoms free without medications to reduce fever (e g  acetaminophen/Tylenol) for 24 hours  Anyone whom you have been in close regular contact (unmasked > 15 minute intervals) since you began having symptoms should be tested within 3-5 days of your positive test if vaccinated or 5-7 days if unvaccinated  Recommend over the counter antihistamines such as Claratin, Allegra, Zyrtec, or Benadryl for congestion  You may also use over the counter nasal sprays such as Flonase for this  Over the counter lozenges such as Cepacol, Ricola, Halls, or Chloroseptic can be used for sore throat symptoms  Recommend Vitamin C 1,000 mg twice daily, Vitamin D3 2000 IU daily, multivitamin and Zinc for immune support     If your symptoms worsen or you develop shortness of breath report to the nearest emergency room  Check cdc gov for most current guidelines as guidelines are subject to change as we learn more about the virus  [address]    Your healthcare provider and/or public health staff have evaluated you and have determined that you do not need to remain in the hospital at this time  At this time you can be isolated at home where you will be monitored by staff from your local or state health department  You should carefully follow the prevention and isolation steps below until a healthcare provider or local or state health department says that you can return to your normal activities  Stay home except to get medical care    People who are mildly ill with COVID-19 are able to isolate at home during their illness  You should restrict activities outside your home, except for getting medical care  Do not go to work, school, or public areas  Avoid using public transportation, ride-sharing, or taxis  Separate yourself from other people and animals in your home    People: As much as possible, you should stay in a specific room and away from other people in your home  Also, you should use a separate bathroom, if available  Animals: You should restrict contact with pets and other animals while you are sick with COVID-19, just like you would around other people  Although there have not been reports of pets or other animals becoming sick with COVID-19, it is still recommended that people sick with COVID-19 limit contact with animals until more information is known about the virus  When possible, have another member of your household care for your animals while you are sick  If you are sick with COVID-19, avoid contact with your pet, including petting, snuggling, being kissed or licked, and sharing food  If you must care for your pet or be around animals while you are sick, wash your hands before and after you interact with pets and wear a facemask   See ITYJV-02 and Animals for more information  Call ahead before visiting your doctor    If you have a medical appointment, call the healthcare provider and tell them that you have or may have COVID-19  This will help the healthcare providers office take steps to keep other people from getting infected or exposed  Wear a facemask    You should wear a facemask when you are around other people (e g , sharing a room or vehicle) or pets and before you enter a healthcare providers office  If you are not able to wear a facemask (for example, because it causes trouble breathing), then people who live with you should not stay in the same room with you, or they should wear a facemask if they enter your room  Cover your coughs and sneezes    Cover your mouth and nose with a tissue when you cough or sneeze  Throw used tissues in a lined trash can  Immediately wash your hands with soap and water for at least 20 seconds or, if soap and water are not available, clean your hands with an alcohol-based hand  that contains at least 60% alcohol  Clean your hands often    Wash your hands often with soap and water for at least 20 seconds, especially after blowing your nose, coughing, or sneezing; going to the bathroom; and before eating or preparing food  If soap and water are not readily available, use an alcohol-based hand  with at least 60% alcohol, covering all surfaces of your hands and rubbing them together until they feel dry  Soap and water are the best option if hands are visibly dirty  Avoid touching your eyes, nose, and mouth with unwashed hands  Avoid sharing personal household items    You should not share dishes, drinking glasses, cups, eating utensils, towels, or bedding with other people or pets in your home  After using these items, they should be washed thoroughly with soap and water      Clean all high-touch surfaces everyday    High touch surfaces include counters, tabletops, doorknobs, bathroom fixtures, toilets, phones, keyboards, tablets, and bedside tables  Also, clean any surfaces that may have blood, stool, or body fluids on them  Use a household cleaning spray or wipe, according to the label instructions  Labels contain instructions for safe and effective use of the cleaning product including precautions you should take when applying the product, such as wearing gloves and making sure you have good ventilation during use of the product  Monitor your symptoms    Seek prompt medical attention if your illness is worsening (e g , difficulty breathing)  Before seeking care, call your healthcare provider and tell them that you have, or are being evaluated for, COVID-19  Put on a facemask before you enter the facility  These steps will help the healthcare providers office to keep other people in the office or waiting room from getting infected or exposed  Ask your healthcare provider to call the local or [facility] health department  Persons who are placed under active monitoring or facilitated self-monitoring should follow instructions provided by their local health department or occupational health professionals, as appropriate  If you have a medical emergency and need to call 911, notify the dispatch personnel that you have, or are being evaluated for COVID-19  If possible, put on a facemask before emergency medical services arrive      Discontinuing home isolation    Patients with confirmed COVID-19 should remain under home isolation precautions until the following conditions are met:   - They have had no fever for at least 24 hours (that is one full day of no fever without the use medicine that reduces fevers)  AND  - other symptoms have improved (for example, when their cough or shortness of breath have improved)  AND  - If had mild or moderate illness, at least 10 days have passed since their symptoms first appeared or if severe illness (needed oxygen) or immunosuppressed, at least 20 days have passed since symptoms first appeared  Patients with confirmed COVID-19 should also notify close contacts (including their workplace) and ask that they self-quarantine  Currently, close contact is defined as being within 6 feet for 15 minutes or more from the period 24 hours starting 48 hours before symptom onset to the time at which the patient went into isolation  Close contacts of patients diagnosed with COVID-19 should be instructed by the patient to self-quarantine for 14 days from the last time of their last contact with the patient  Source: [employer]         Follow up with PCP in 3-5 days  Proceed to  ER if symptoms worsen  Chief Complaint     Chief Complaint   Patient presents with    COVID-19     headache, fatigue, muscle ache and nausea  symptoms started x2 days ago in the middle of the night  pt has not been vacinated  no recent travel  History of Present Illness       64year old male or female presents with complaints of headache, fatigue, muscle ache and nausea for 2 days duration  Pt denies fever, chills, runny nose, sore throat, cough,  shortness of breath, chest pain,  vomiting, diarrhea, myalgias, and loss of taste and smell  Pt has not been exposed to anyone who has tested positive for COVID to their knowledge and has not traveled outside of the state in the last 2 weeks  She denies history of asthma She denies smoking and use of e-cigarettes  No other concerns or complaints today  She is not vaccinated against COVID 19  She had COVID a few months ago  Pt is unable to get vaccinated as she has Lupus  No other concerns or complaints today  Review of Systems   Review of Systems   Constitutional: Positive for fatigue  Negative for chills and fever  HENT: Negative for congestion, postnasal drip, rhinorrhea and sore throat  Respiratory: Negative for cough and shortness of breath      Cardiovascular: Negative for chest pain  Gastrointestinal: Positive for nausea  Negative for diarrhea and vomiting  Musculoskeletal: Positive for myalgias  Neurological: Positive for headaches           Current Medications       Current Outpatient Medications:     ALPRAZolam (XANAX) 0 5 mg tablet, alprazolam 0 5 mg tablet, Disp: , Rfl:     amitriptyline (ELAVIL) 10 mg tablet, , Disp: , Rfl:     amoxicillin (AMOXIL) 875 mg tablet, , Disp: , Rfl:     carisoprodol (SOMA) 350 mg tablet, every 6 (six) hours as needed , Disp: , Rfl:     carisoprodol (SOMA) 350 mg tablet, Take 350 mg by mouth, Disp: , Rfl:     Cholecalciferol (VITAMIN D3) 125 MCG (5000 UT) CAPS, Take 1 capsule by mouth daily, Disp: , Rfl:     clonazePAM (KlonoPIN) 1 mg tablet, , Disp: , Rfl:     clonazePAM (KlonoPIN) 1 mg tablet, clonazepam 1 mg tablet, Disp: , Rfl:     FLUoxetine (PROzac) 40 MG capsule, TAKE TWO CAPSULES BY MOUTH ONCE EVERY DAY, Disp: , Rfl:     FLUoxetine (PROzac) 40 MG capsule, fluoxetine 40 mg capsule, Disp: , Rfl:     hydroxychloroquine (PLAQUENIL) 200 mg tablet, , Disp: , Rfl:     hydroxychloroquine (PLAQUENIL) 200 mg tablet, hydroxychloroquine 200 mg tablet, Disp: , Rfl:     liothyronine (CYTOMEL) 5 mcg tablet, , Disp: , Rfl:     methylphenidate (METADATE ER) 10 MG ER tablet, TAKE ONE TABLET BY MOUTH TWICE DAILY IN THE MORNING AND IN THE AFTERNOON, Disp: , Rfl:     Multiple Vitamins-Minerals (MULTIVITAMIN ADULT PO), 1 daily, Disp: , Rfl:     nystatin (MYCOSTATIN) ointment, Apply topically 2 (two) times a day (Patient not taking: Reported on 11/20/2020), Disp: 30 g, Rfl: 0    OMEGA-3 FATTY ACIDS PO, Take 1 capsule by mouth daily, Disp: , Rfl:     phenazopyridine (PYRIDIUM) 200 mg tablet, Take 1 tablet (200 mg total) by mouth 3 (three) times a day with meals, Disp: 10 tablet, Rfl: 0    saccharomyces boulardii (FLORASTOR) 250 mg capsule, 1 daily, Disp: , Rfl:     temazepam (RESTORIL) 30 mg capsule, temazepam 30 mg capsule, Disp: , Rfl:     temazepam (RESTORIL) 30 mg capsule, temazepam 30 mg capsule, Disp: , Rfl:     traMADol (ULTRAM) 50 mg tablet, Take 1 tablet (50 mg total) by mouth every 8 (eight) hours as needed for moderate pain (Patient not taking: Reported on 11/20/2020), Disp: 15 tablet, Rfl: 0    traZODone (DESYREL) 50 mg tablet, TAKE 1-2 TABLETS BY MOUTH AT BEDTIME AS NEEDED FOR SLEEP, Disp: , Rfl:     Current Allergies     Allergies as of 09/24/2021 - Reviewed 09/24/2021   Allergen Reaction Noted    Androgens  09/09/2019    Sulfa antibiotics Other (See Comments) 06/05/2015            The following portions of the patient's history were reviewed and updated as appropriate: allergies, current medications, past family history, past medical history, past social history, past surgical history and problem list      Past Medical History:   Diagnosis Date    Disease of thyroid gland     Fibromyalgia     Lupus ([address] )     Proctitis     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[address] )     Vitiligo        Past Surgical History:   Procedure Laterality Date    ADENOIDECTOMY      APPENDECTOMY      CHOLECYSTECTOMY      OVARIAN CYST SURGERY      TONSILLECTOMY         No family history on file  Medications have been verified  Objective   Ht 5' 3" (1 6 m)   Wt 58 5 kg (129 lb)   BMI 22 85 kg/m²   No LMP recorded  Patient is postmenopausal        Physical Exam     Physical Exam  Constitutional:       General: She is awake  She is not in acute distress  Appearance: Normal appearance  She is well-developed and well-groomed  She is not ill-appearing, toxic-appearing or diaphoretic  HENT:      Head: Normocephalic and atraumatic  Right Ear: Hearing, tympanic membrane, ear canal and external ear normal  There is no impacted cerumen  No foreign body  Left Ear: Hearing, tympanic membrane, ear canal and external ear normal  There is no impacted cerumen  No foreign body  Nose: No mucosal edema, congestion or rhinorrhea  Right Nostril: No foreign body, epistaxis or occlusion  Left Nostril: No foreign body, epistaxis or occlusion  Right Turbinates: Not enlarged, swollen or pale  Left Turbinates: Not enlarged, swollen or pale  Mouth/Throat:      Lips: Pink  No lesions  Mouth: Mucous membranes are moist  No injury, oral lesions or angioedema  Dentition: Normal dentition  Tongue: No lesions  Tongue does not deviate from midline  Palate: No mass and lesions  Pharynx: Uvula midline  No pharyngeal swelling, oropharyngeal exudate, posterior oropharyngeal erythema or uvula swelling  Tonsils: No tonsillar exudate  Eyes:      General: Lids are normal  Vision grossly intact  Gaze aligned appropriately  Cardiovascular:      Rate and Rhythm: Normal rate  Pulmonary:      Effort: Pulmonary effort is normal       Comments: Patient is speaking in full sentences with no increased respiratory effort  No audible wheezing or stridor  Musculoskeletal:      Cervical back: Normal range of motion  Skin:     General: Skin is warm and dry  Neurological:      Mental Status: She is alert and oriented to person, place, and time  Coordination: Coordination is intact  Gait: Gait is intact  Psychiatric:         Attention and Perception: Attention and perception normal          Mood and Affect: Mood and affect normal          Speech: Speech normal          Behavior: Behavior normal  Behavior is cooperative  Note: Portions of this record may have been created with voice recognition software  Occasional wrong word or "sound a like" substitutions may have occurred due to the inherent limitations of voice recognition software  Please read the chart carefully and recognize, using context, where substitutions have occurred  *

## 2021-09-24 NOTE — PATIENT INSTRUCTIONS
Check or sign up for [facility]'s my Chart to view your results  We do not call patient's with negative results  Go directly home after today's visit, quarantine until you receive a negative result  If you have a positive result you need to quarantine at home for a minimum of 10 days from the date of testing  You may end your quarantine when you are symptoms free without medications to reduce fever (e g  acetaminophen/Tylenol) for 24 hours  Anyone whom you have been in close regular contact (unmasked > 15 minute intervals) since you began having symptoms should be tested within 3-5 days of your positive test if vaccinated or 5-7 days if unvaccinated  Recommend over the counter antihistamines such as Claratin, Allegra, Zyrtec, or Benadryl for congestion  You may also use over the counter nasal sprays such as Flonase for this  Over the counter lozenges such as Cepacol, Ricola, Halls, or Chloroseptic can be used for sore throat symptoms  Recommend Vitamin C 1,000 mg twice daily, Vitamin D3 2000 IU daily, multivitamin and Zinc for immune support  If your symptoms worsen or you develop shortness of breath report to the nearest emergency room  Check cdc gov for most current guidelines as guidelines are subject to change as we learn more about the virus  [address]    Your healthcare provider and/or public health staff have evaluated you and have determined that you do not need to remain in the hospital at this time  At this time you can be isolated at home where you will be monitored by staff from your local or state health department  You should carefully follow the prevention and isolation steps below until a healthcare provider or local or state health department says that you can return to your normal activities  Stay home except to get medical care    People who are mildly ill with COVID-19 are able to isolate at home during their illness   You should restrict activities outside your home, except for getting medical care  Do not go to work, school, or public areas  Avoid using public transportation, ride-sharing, or taxis  Separate yourself from other people and animals in your home    People: As much as possible, you should stay in a specific room and away from other people in your home  Also, you should use a separate bathroom, if available  Animals: You should restrict contact with pets and other animals while you are sick with COVID-19, just like you would around other people  Although there have not been reports of pets or other animals becoming sick with COVID-19, it is still recommended that people sick with COVID-19 limit contact with animals until more information is known about the virus  When possible, have another member of your household care for your animals while you are sick  If you are sick with COVID-19, avoid contact with your pet, including petting, snuggling, being kissed or licked, and sharing food  If you must care for your pet or be around animals while you are sick, wash your hands before and after you interact with pets and wear a facemask  See COVID-19 and Animals for more information  Call ahead before visiting your doctor    If you have a medical appointment, call the healthcare provider and tell them that you have or may have COVID-19  This will help the healthcare providers office take steps to keep other people from getting infected or exposed  Wear a facemask    You should wear a facemask when you are around other people (e g , sharing a room or vehicle) or pets and before you enter a healthcare providers office  If you are not able to wear a facemask (for example, because it causes trouble breathing), then people who live with you should not stay in the same room with you, or they should wear a facemask if they enter your room  Cover your coughs and sneezes    Cover your mouth and nose with a tissue when you cough or sneeze   Throw used tissues in a lined trash can  Immediately wash your hands with soap and water for at least 20 seconds or, if soap and water are not available, clean your hands with an alcohol-based hand  that contains at least 60% alcohol  Clean your hands often    Wash your hands often with soap and water for at least 20 seconds, especially after blowing your nose, coughing, or sneezing; going to the bathroom; and before eating or preparing food  If soap and water are not readily available, use an alcohol-based hand  with at least 60% alcohol, covering all surfaces of your hands and rubbing them together until they feel dry  Soap and water are the best option if hands are visibly dirty  Avoid touching your eyes, nose, and mouth with unwashed hands  Avoid sharing personal household items    You should not share dishes, drinking glasses, cups, eating utensils, towels, or bedding with other people or pets in your home  After using these items, they should be washed thoroughly with soap and water  Clean all high-touch surfaces everyday    High touch surfaces include counters, tabletops, doorknobs, bathroom fixtures, toilets, phones, keyboards, tablets, and bedside tables  Also, clean any surfaces that may have blood, stool, or body fluids on them  Use a household cleaning spray or wipe, according to the label instructions  Labels contain instructions for safe and effective use of the cleaning product including precautions you should take when applying the product, such as wearing gloves and making sure you have good ventilation during use of the product  Monitor your symptoms    Seek prompt medical attention if your illness is worsening (e g , difficulty breathing)  Before seeking care, call your healthcare provider and tell them that you have, or are being evaluated for, COVID-19  Put on a facemask before you enter the facility   These steps will help the healthcare providers office to keep other people in the office or waiting room from getting infected or exposed  Ask your healthcare provider to call the local or state health department  Persons who are placed under active monitoring or facilitated self-monitoring should follow instructions provided by their local health department or occupational health professionals, as appropriate  If you have a medical emergency and need to call 911, notify the dispatch personnel that you have, or are being evaluated for COVID-19  If possible, put on a facemask before emergency medical services arrive  Discontinuing home isolation    Patients with confirmed COVID-19 should remain under home isolation precautions until the following conditions are met:   - They have had no fever for at least 24 hours (that is one full day of no fever without the use medicine that reduces fevers)  AND  - other symptoms have improved (for example, when their cough or shortness of breath have improved)  AND  - If had mild or moderate illness, at least 10 days have passed since their symptoms first appeared or if severe illness (needed oxygen) or immunosuppressed, at least 20 days have passed since symptoms first appeared  Patients with confirmed COVID-19 should also notify close contacts (including their workplace) and ask that they self-quarantine  Currently, close contact is defined as being within 6 feet for 15 minutes or more from the period 24 hours starting 48 hours before symptom onset to the time at which the patient went into isolation  Close contacts of patients diagnosed with COVID-19 should be instructed by the patient to self-quarantine for 14 days from the last time of their last contact with the patient       Source: [employer]

## 2021-09-24 NOTE — LETTER
September 24, 2021     Patient: Ayden Renee   YOB: 1965   Date of Visit: 9/24/2021       To Whom It May Concern: It is my medical opinion that Steph Phillip should remain out of work until negative test result  Pt may work from home if remote work is available       If you have any questions or concerns, please don't hesitate to call           Sincerely,        Tara Renee PA-C    CC: No Recipients

## 2021-09-25 LAB — SARS-COV-2 RNA RESP QL NAA+PROBE: NEGATIVE

## 2021-10-26 ENCOUNTER — OFFICE VISIT (OUTPATIENT)
Dept: URGENT CARE | Age: 56
End: 2021-10-26
Payer: COMMERCIAL

## 2021-10-26 VITALS
TEMPERATURE: 98.6 F | WEIGHT: 129 LBS | BODY MASS INDEX: 22.86 KG/M2 | DIASTOLIC BLOOD PRESSURE: 73 MMHG | HEIGHT: 63 IN | HEART RATE: 85 BPM | RESPIRATION RATE: 18 BRPM | OXYGEN SATURATION: 100 % | SYSTOLIC BLOOD PRESSURE: 117 MMHG

## 2021-10-26 DIAGNOSIS — G44.319 ACUTE POST-TRAUMATIC HEADACHE, NOT INTRACTABLE: Primary | ICD-10-CM

## 2021-10-26 PROCEDURE — 99213 OFFICE O/P EST LOW 20 MIN: CPT | Performed by: PHYSICIAN ASSISTANT

## 2021-10-26 PROCEDURE — 96372 THER/PROPH/DIAG INJ SC/IM: CPT | Performed by: PHYSICIAN ASSISTANT

## 2021-10-26 RX ORDER — KETOROLAC TROMETHAMINE 30 MG/ML
30 INJECTION, SOLUTION INTRAMUSCULAR; INTRAVENOUS ONCE
Status: COMPLETED | OUTPATIENT
Start: 2021-10-26 | End: 2021-10-26

## 2021-10-26 RX ADMIN — KETOROLAC TROMETHAMINE 30 MG: 30 INJECTION, SOLUTION INTRAMUSCULAR; INTRAVENOUS at 10:25

## 2021-11-26 ENCOUNTER — OFFICE VISIT (OUTPATIENT)
Dept: URGENT CARE | Age: 56
End: 2021-11-26
Payer: COMMERCIAL

## 2021-11-26 VITALS — WEIGHT: 129 LBS | HEIGHT: 63 IN | BODY MASS INDEX: 22.86 KG/M2

## 2021-11-26 DIAGNOSIS — Z11.52 ENCOUNTER FOR SCREENING FOR COVID-19: ICD-10-CM

## 2021-11-26 DIAGNOSIS — M79.10 MYALGIA: ICD-10-CM

## 2021-11-26 DIAGNOSIS — R30.0 DYSURIA: Primary | ICD-10-CM

## 2021-11-26 DIAGNOSIS — R11.0 NAUSEA: ICD-10-CM

## 2021-11-26 LAB
SL AMB  POCT GLUCOSE, UA: NEGATIVE
SL AMB LEUKOCYTE ESTERASE,UA: ABNORMAL
SL AMB POCT BILIRUBIN,UA: NEGATIVE
SL AMB POCT BLOOD,UA: ABNORMAL
SL AMB POCT CLARITY,UA: CLEAR
SL AMB POCT COLOR,UA: YELLOW
SL AMB POCT KETONES,UA: NEGATIVE
SL AMB POCT NITRITE,UA: NEGATIVE
SL AMB POCT PH,UA: 6
SL AMB POCT SPECIFIC GRAVITY,UA: 1
SL AMB POCT URINE PROTEIN: NEGATIVE
SL AMB POCT UROBILINOGEN: 0.2

## 2021-11-26 PROCEDURE — 96372 THER/PROPH/DIAG INJ SC/IM: CPT | Performed by: NURSE PRACTITIONER

## 2021-11-26 PROCEDURE — 99213 OFFICE O/P EST LOW 20 MIN: CPT | Performed by: NURSE PRACTITIONER

## 2021-11-26 PROCEDURE — 81002 URINALYSIS NONAUTO W/O SCOPE: CPT | Performed by: NURSE PRACTITIONER

## 2021-11-26 RX ORDER — KETOROLAC TROMETHAMINE 30 MG/ML
30 INJECTION, SOLUTION INTRAMUSCULAR; INTRAVENOUS ONCE
Status: COMPLETED | OUTPATIENT
Start: 2021-11-26 | End: 2021-11-26

## 2021-11-26 RX ORDER — ONDANSETRON 4 MG/1
4 TABLET, ORALLY DISINTEGRATING ORAL EVERY 6 HOURS PRN
Qty: 20 TABLET | Refills: 0 | Status: SHIPPED | OUTPATIENT
Start: 2021-11-26

## 2021-11-26 RX ORDER — NITROFURANTOIN 25; 75 MG/1; MG/1
100 CAPSULE ORAL 2 TIMES DAILY
Qty: 10 CAPSULE | Refills: 0 | Status: SHIPPED | OUTPATIENT
Start: 2021-11-26

## 2021-11-26 RX ADMIN — KETOROLAC TROMETHAMINE 30 MG: 30 INJECTION, SOLUTION INTRAMUSCULAR; INTRAVENOUS at 11:07

## 2021-11-27 ENCOUNTER — TELEPHONE (OUTPATIENT)
Dept: URGENT CARE | Age: 56
End: 2021-11-27

## 2021-11-28 ENCOUNTER — TELEPHONE (OUTPATIENT)
Dept: URGENT CARE | Age: 56
End: 2021-11-28

## 2021-11-28 ENCOUNTER — DOCUMENTATION (OUTPATIENT)
Dept: URGENT CARE | Age: 56
End: 2021-11-28

## 2021-12-02 ENCOUNTER — OFFICE VISIT (OUTPATIENT)
Dept: URGENT CARE | Age: 56
End: 2021-12-02
Payer: COMMERCIAL

## 2021-12-02 VITALS
HEART RATE: 82 BPM | RESPIRATION RATE: 17 BRPM | WEIGHT: 135 LBS | TEMPERATURE: 98.5 F | OXYGEN SATURATION: 100 % | BODY MASS INDEX: 23.91 KG/M2

## 2021-12-02 DIAGNOSIS — R52 GENERALIZED BODY ACHES: Primary | ICD-10-CM

## 2021-12-02 PROCEDURE — 96372 THER/PROPH/DIAG INJ SC/IM: CPT | Performed by: NURSE PRACTITIONER

## 2021-12-02 PROCEDURE — 0241U HB NFCT DS VIR RESP RNA 4 TRGT: CPT | Performed by: NURSE PRACTITIONER

## 2021-12-02 PROCEDURE — 99213 OFFICE O/P EST LOW 20 MIN: CPT | Performed by: NURSE PRACTITIONER

## 2021-12-02 RX ORDER — KETOROLAC TROMETHAMINE 30 MG/ML
15 INJECTION, SOLUTION INTRAMUSCULAR; INTRAVENOUS ONCE
Status: COMPLETED | OUTPATIENT
Start: 2021-12-02 | End: 2021-12-02

## 2021-12-02 RX ADMIN — KETOROLAC TROMETHAMINE 15 MG: 30 INJECTION, SOLUTION INTRAMUSCULAR; INTRAVENOUS at 10:44

## 2021-12-03 LAB
FLUAV RNA RESP QL NAA+PROBE: NEGATIVE
FLUBV RNA RESP QL NAA+PROBE: NEGATIVE
RSV RNA RESP QL NAA+PROBE: NEGATIVE
SARS-COV-2 RNA RESP QL NAA+PROBE: NEGATIVE

## 2021-12-29 ENCOUNTER — AMB VIDEO VISIT (OUTPATIENT)
Dept: OTHER | Facility: HOSPITAL | Age: 56
End: 2021-12-29

## 2021-12-29 PROCEDURE — ECARE PR SL URGENT CARE VIRTUAL VISIT: Performed by: FAMILY MEDICINE

## 2022-05-30 ENCOUNTER — OFFICE VISIT (OUTPATIENT)
Dept: URGENT CARE | Age: 57
End: 2022-05-30
Payer: COMMERCIAL

## 2022-05-30 VITALS
RESPIRATION RATE: 18 BRPM | SYSTOLIC BLOOD PRESSURE: 120 MMHG | HEIGHT: 63 IN | HEART RATE: 78 BPM | WEIGHT: 125 LBS | DIASTOLIC BLOOD PRESSURE: 60 MMHG | TEMPERATURE: 96.1 F | BODY MASS INDEX: 22.15 KG/M2 | OXYGEN SATURATION: 98 %

## 2022-05-30 DIAGNOSIS — R50.9 FEVER, UNSPECIFIED FEVER CAUSE: ICD-10-CM

## 2022-05-30 DIAGNOSIS — R09.81 NASAL CONGESTION: ICD-10-CM

## 2022-05-30 DIAGNOSIS — R35.0 FREQUENT URINATION: Primary | ICD-10-CM

## 2022-05-30 DIAGNOSIS — R52 BODY ACHES: ICD-10-CM

## 2022-05-30 LAB
SL AMB  POCT GLUCOSE, UA: NEGATIVE
SL AMB LEUKOCYTE ESTERASE,UA: NEGATIVE
SL AMB POCT BILIRUBIN,UA: NEGATIVE
SL AMB POCT BLOOD,UA: ABNORMAL
SL AMB POCT CLARITY,UA: CLEAR
SL AMB POCT COLOR,UA: YELLOW
SL AMB POCT KETONES,UA: NEGATIVE
SL AMB POCT NITRITE,UA: ABNORMAL
SL AMB POCT PH,UA: 7
SL AMB POCT SPECIFIC GRAVITY,UA: 1.01
SL AMB POCT URINE PROTEIN: NEGATIVE
SL AMB POCT UROBILINOGEN: 0.2

## 2022-05-30 PROCEDURE — 87636 SARSCOV2 & INF A&B AMP PRB: CPT | Performed by: NURSE PRACTITIONER

## 2022-05-30 PROCEDURE — 81002 URINALYSIS NONAUTO W/O SCOPE: CPT | Performed by: NURSE PRACTITIONER

## 2022-05-30 PROCEDURE — 99213 OFFICE O/P EST LOW 20 MIN: CPT | Performed by: NURSE PRACTITIONER

## 2022-05-30 RX ORDER — FLUTICASONE PROPIONATE 50 MCG
1 SPRAY, SUSPENSION (ML) NASAL DAILY
Qty: 18.2 ML | Refills: 0 | Status: SHIPPED | OUTPATIENT
Start: 2022-05-30

## 2022-05-30 NOTE — LETTER
[facility]uz[facility] Negron [address]  [address] [address]  Dept: [phone]    May 30, 2022    Patient: Ben Laureano  YOB: 1965    Ben Laureano was seen and evaluated at our [facility]  Please note if Covid and Flu tests are negative, they may return to work when fever free for 24 hours without the use of a fever reducing agent  If Covid or Flu test is positive, they may return to work on 06/03/2022, as this is 5 days from the onset of symptoms  Upon return, they must then adhere to strict masking for an additional 5 days      Sincerely,          Inga Primrose, CRNP

## 2022-05-30 NOTE — PATIENT INSTRUCTIONS
Follow up with pcp   Take meds as directed  Results will be on your [employer] kenneth in the next 24-48 hours    Change your meds to zyrtec from claritin   Use flonase as directed

## 2022-05-30 NOTE — PROGRESS NOTES
NAME: Gely Pedersen is a 64 y o  female  : 1965    MRN: [id number]    /60   Pulse 78   Temp (!) 96 1 °F (35 6 °C)   Resp 18   Ht 5' 3" (1 6 m)   Wt 56 7 kg (125 lb)   SpO2 98%   BMI 22 14 kg/m²     Assessment and Plan   Frequent urination [R35 0]  1  Frequent urination  POCT urine dip   2  Body aches  Covid/Flu-Office Collect   3  Fever, unspecified fever cause  Covid/Flu-Office Collect    fluticasone (FLONASE) 50 mcg/act nasal spray   4  Nasal congestion  fluticasone (FLONASE) 50 mcg/act nasal spray       Aruna Goldsmith was seen today for cold like symptoms  Diagnoses and all orders for this visit:    Frequent urination  -     POCT urine dip    Body aches  -     Covid/Flu-Office Collect    Fever, unspecified fever cause  -     Covid/Flu-Office Collect  -     fluticasone (FLONASE) 50 mcg/act nasal spray; 1 spray into each nostril daily    Nasal congestion  -     fluticasone (FLONASE) 50 mcg/act nasal spray; 1 spray into each nostril daily      Urine dip done, no culture sent today   covid testing and flu testing done  RX printed for pt  Patient Instructions   Patient Instructions   Follow up with pcp   Take meds as directed  Results will be on your [employer] kenneth in the next 24-48 hours    Change your meds to zyrtec from claritin   Use flonase as directed       Proceed to the nearest ER if symptoms worsen, Follow up with your PCP  Continue to social distance, wash your hands, and wear your masks  Please continue to follow the CDC  gov guidelines daily for they are subject to change on COVID-19    Chief Complaint     Chief Complaint   Patient presents with    Cold Like Symptoms     Chills, sore throat, Upset stomach, and frequent urination          History of Present Illness     63 yo female here today for chills, sore throat, upset stomach and increase in urination  She has had flare up of her allergies for the past few days however yesterday worsening HA and bodyaches with low grade fever   She has no sinus pressure at this time  She had some increased with urination and wanted her urine checked but has no abdominal pain or back pain present today  She feels tired  She has had COVID in the past about one year ago and has not been vaccinated for this  Review of Systems   Review of Systems   Constitutional: Positive for fatigue and fever  HENT: Positive for congestion, postnasal drip, sneezing and sore throat  Negative for ear discharge, ear pain, sinus pressure, sinus pain and tinnitus  Eyes: Negative  Respiratory: Negative  Negative for cough  Cardiovascular: Negative  Genitourinary: Negative  Musculoskeletal: Positive for arthralgias (bodyaches)  Skin: Negative  Neurological: Positive for headaches           Current Medications       Current Outpatient Medications:     fluticasone (FLONASE) 50 mcg/act nasal spray, 1 spray into each nostril daily, Disp: 18 2 mL, Rfl: 0    ALPRAZolam (XANAX) 0 5 mg tablet, alprazolam 0 5 mg tablet (Patient not taking: Reported on 11/26/2021), Disp: , Rfl:     amitriptyline (ELAVIL) 10 mg tablet, , Disp: , Rfl:     amoxicillin (AMOXIL) 875 mg tablet, , Disp: , Rfl:     carisoprodol (SOMA) 350 mg tablet, every 6 (six) hours as needed , Disp: , Rfl:     carisoprodol (SOMA) 350 mg tablet, Take 350 mg by mouth (Patient not taking: Reported on 11/26/2021 ), Disp: , Rfl:     Cholecalciferol (VITAMIN D3) 125 MCG (5000 UT) CAPS, Take 1 capsule by mouth daily, Disp: , Rfl:     clonazePAM (KlonoPIN) 1 mg tablet, , Disp: , Rfl:     clonazePAM (KlonoPIN) 1 mg tablet, clonazepam 1 mg tablet (Patient not taking: Reported on 11/26/2021), Disp: , Rfl:     FLUoxetine (PROzac) 40 MG capsule, TAKE TWO CAPSULES BY MOUTH ONCE EVERY DAY (Patient not taking: Reported on 11/26/2021), Disp: , Rfl:     FLUoxetine (PROzac) 40 MG capsule, fluoxetine 40 mg capsule, Disp: , Rfl:     hydroxychloroquine (PLAQUENIL) 200 mg tablet, , Disp: , Rfl:     hydroxychloroquine (PLAQUENIL) 200 mg tablet, hydroxychloroquine 200 mg tablet, Disp: , Rfl:     liothyronine (CYTOMEL) 5 mcg tablet, , Disp: , Rfl:     methylphenidate (METADATE ER) 10 MG ER tablet, TAKE ONE TABLET BY MOUTH TWICE DAILY IN THE MORNING AND IN THE AFTERNOON, Disp: , Rfl:     Multiple Vitamins-Minerals (MULTIVITAMIN ADULT PO), 1 daily, Disp: , Rfl:     nitrofurantoin (MACROBID) 100 mg capsule, Take 1 capsule (100 mg total) by mouth 2 (two) times a day, Disp: 10 capsule, Rfl: 0    nystatin (MYCOSTATIN) ointment, Apply topically 2 (two) times a day (Patient not taking: Reported on 11/20/2020), Disp: 30 g, Rfl: 0    OMEGA-3 FATTY ACIDS PO, Take 1 capsule by mouth daily, Disp: , Rfl:     ondansetron (Zofran ODT) 4 mg disintegrating tablet, Take 1 tablet (4 mg total) by mouth every 6 (six) hours as needed for nausea or vomiting, Disp: 20 tablet, Rfl: 0    phenazopyridine (PYRIDIUM) 200 mg tablet, Take 1 tablet (200 mg total) by mouth 3 (three) times a day with meals (Patient not taking: Reported on 11/26/2021 ), Disp: 10 tablet, Rfl: 0    saccharomyces boulardii (FLORASTOR) 250 mg capsule, 1 daily, Disp: , Rfl:     temazepam (RESTORIL) 30 mg capsule, temazepam 30 mg capsule, Disp: , Rfl:     temazepam (RESTORIL) 30 mg capsule, temazepam 30 mg capsule (Patient not taking: Reported on 11/26/2021), Disp: , Rfl:     traMADol (ULTRAM) 50 mg tablet, Take 1 tablet (50 mg total) by mouth every 8 (eight) hours as needed for moderate pain (Patient not taking: Reported on 11/20/2020), Disp: 15 tablet, Rfl: 0    traZODone (DESYREL) 50 mg tablet, TAKE 1-2 TABLETS BY MOUTH AT BEDTIME AS NEEDED FOR SLEEP (Patient not taking: Reported on 11/26/2021), Disp: , Rfl:     Current Allergies     Allergies as of 05/30/2022 - Reviewed 05/30/2022   Allergen Reaction Noted    Androgens  09/09/2019    Sulfa antibiotics Other (See Comments) 06/05/2015              Past Medical History:   Diagnosis Date    Disease of thyroid gland     Fibromyalgia     Lupus (HCC)     Proctitis     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HCC)     Vitiligo        Past Surgical History:   Procedure Laterality Date    ADENOIDECTOMY      APPENDECTOMY      CHOLECYSTECTOMY      OVARIAN CYST SURGERY      TONSILLECTOMY         History reviewed  No pertinent family history  Medications have been verified  The following portions of the patient's history were reviewed and updated as appropriate: allergies, current medications, past family history, past medical history, past social history, past surgical history and problem list     Objective   /60   Pulse 78   Temp (!) 96 1 °F (35 6 °C)   Resp 18   Ht 5' 3" (1 6 m)   Wt 56 7 kg (125 lb)   SpO2 98%   BMI 22 14 kg/m²      Physical Exam     Physical Exam  Constitutional:       General: She is awake  She is not in acute distress  Appearance: Normal appearance  She is well-developed  She is not ill-appearing  HENT:      Head: Normocephalic  Right Ear: Hearing, tympanic membrane, ear canal and external ear normal  There is no impacted cerumen  Left Ear: Hearing, tympanic membrane, ear canal and external ear normal  There is no impacted cerumen  Nose: Rhinorrhea present  No mucosal edema or congestion  Right Turbinates: Swollen and pale  Left Turbinates: Not swollen or pale  Right Sinus: No maxillary sinus tenderness  Left Sinus: No maxillary sinus tenderness  Mouth/Throat:      Lips: Pink  Mouth: Mucous membranes are moist       Pharynx: Oropharynx is clear  Uvula midline  No pharyngeal swelling, oropharyngeal exudate or posterior oropharyngeal erythema  Tonsils: No tonsillar exudate  Comments: Clear drainage in back of throat  Eyes:      General: Lids are normal       Extraocular Movements: Extraocular movements intact  Pupils: Pupils are equal, round, and reactive to light  Cardiovascular:      Rate and Rhythm: Normal rate and regular rhythm  Heart sounds: Normal heart sounds  Pulmonary:      Effort: Pulmonary effort is normal       Breath sounds: Normal breath sounds and air entry  No decreased breath sounds, wheezing, rhonchi or rales  Musculoskeletal:      Comments: bodyaches   Skin:     General: Skin is warm  Capillary Refill: Capillary refill takes less than 2 seconds  Neurological:      General: No focal deficit present  Mental Status: She is alert and oriented to person, place, and time  Psychiatric:         Attention and Perception: Attention normal          Mood and Affect: Mood normal          Behavior: Behavior is cooperative  Note: Portions of this record may have been created with voice recognition software  Occasional wrong word or "sound a like" substitutions may have occurred due to the inherent limitations of voice recognition software  Please read the chart carefully and recognize, using context, where substitutions have occurred  SHAY Rao

## 2022-05-31 LAB
FLUAV RNA RESP QL NAA+PROBE: NEGATIVE
FLUBV RNA RESP QL NAA+PROBE: NEGATIVE
SARS-COV-2 RNA RESP QL NAA+PROBE: NEGATIVE

## 2022-07-15 ENCOUNTER — OFFICE VISIT (OUTPATIENT)
Dept: URGENT CARE | Age: 57
End: 2022-07-15
Payer: COMMERCIAL

## 2022-07-15 VITALS
HEART RATE: 78 BPM | RESPIRATION RATE: 16 BRPM | OXYGEN SATURATION: 99 % | SYSTOLIC BLOOD PRESSURE: 114 MMHG | TEMPERATURE: 97.5 F | DIASTOLIC BLOOD PRESSURE: 76 MMHG

## 2022-07-15 DIAGNOSIS — B34.9 VIRAL ILLNESS: Primary | ICD-10-CM

## 2022-07-15 PROCEDURE — 99213 OFFICE O/P EST LOW 20 MIN: CPT | Performed by: PHYSICIAN ASSISTANT

## 2022-07-15 NOTE — PROGRESS NOTES
[facility]        NAME: Aixa Oneal is a 64 y o  female  : 1965    MRN: [id number]  DATE: July 15, 2022  TIME: 12:10 PM    Assessment and Plan   Viral illness [B34 9]  1  Viral illness           Patient Instructions     Patient Instructions     Viral Syndrome   WHAT YOU NEED TO KNOW:   Viral syndrome is a term used for symptoms of an infection caused by a virus  Viruses are spread easily from person to person through the air and on shared items  DISCHARGE INSTRUCTIONS:   Call your local emergency number (911 in the [address] [place] [address],[address]) or have someone else call if:   · You have a seizure  · You cannot be woken  · You have chest pain or trouble breathing  Return to the emergency department if:   · You have a stiff neck, a bad headache, and sensitivity to light  · You feel weak, dizzy, or confused  · You stop urinating or urinate a lot less than usual     · You cough up blood or thick yellow or green mucus  · You have severe abdominal pain or your abdomen is larger than usual     Call your doctor if:   · Your symptoms do not get better with treatment or get worse after 3 days  · You have a rash or ear pain  · You have burning when you urinate  · You have questions or concerns about your condition or care  Medicines: You may  need any of the following:  · Acetaminophen  decreases pain and fever  It is available without a doctor's order  Ask how much to take and how often to take it  Follow directions  Read the labels of all other medicines you are using to see if they also contain acetaminophen, or ask your doctor or pharmacist  Acetaminophen can cause liver damage if not taken correctly  Do not use more than 4 grams (4,000 milligrams) total of acetaminophen in one day  · NSAIDs , such as ibuprofen, help decrease swelling, pain, and fever  NSAIDs can cause stomach bleeding or kidney problems in certain people   If you take blood thinner medicine, always ask your healthcare provider if NSAIDs are safe for you  Always read the medicine label and follow directions  · Cold medicine  helps decrease swelling, control a cough, and relieve chest or nasal congestion  · Saline nasal spray  helps decrease nasal congestion  · Take your medicine as directed  Contact your healthcare provider if you think your medicine is not helping or if you have side effects  Tell him of her if you are allergic to any medicine  Keep a list of the medicines, vitamins, and herbs you take  Include the amounts, and when and why you take them  Bring the list or the pill bottles to follow-up visits  Carry your medicine list with you in case of an emergency  Manage your symptoms:   · Drink liquids as directed to prevent dehydration  Ask how much liquid to drink each day and which liquids are best for you  Ask if you should drink an oral rehydration solution (ORS)  An ORS has the right amounts of water, salts, and sugar you need to replace body fluids  This may help prevent dehydration caused by vomiting or diarrhea  Do not drink liquids with caffeine  Liquids with caffeine can make dehydration worse  · Get plenty of rest to help your body heal   Take naps throughout the day  Ask your healthcare provider when you can return to work and your normal activities  · Use a cool mist humidifier to help you breathe easier  Ask your healthcare provider how to use a cool mist humidifier  · Eat honey or use cough drops for a sore throat  Cough drops are available without a doctor's order  Follow directions for taking cough drops  · Do not smoke or be close to anyone who is smoking  Nicotine and other chemicals in cigarettes and cigars can cause lung damage  Smoking can also delay healing  Ask your healthcare provider for information if you currently smoke and need help to quit  E-cigarettes or smokeless tobacco still contain nicotine  Talk to your healthcare provider before you use these products      Prevent the spread of germs:       1  Wash your hands often  Wash your hands several times each day  Wash after you use the bathroom, change a child's diaper, and before you prepare or eat food  Use soap and water every time  Rub your soapy hands together, lacing your fingers  Wash the front and back of your hands, and in between your fingers  Use the fingers of one hand to scrub under the fingernails of the other hand  Wash for at least 20 seconds  Rinse with warm, running water for several seconds  Then dry your hands with a clean towel or paper towel  Use hand  that contains alcohol if soap and water are not available  Do not touch your eyes, nose, or mouth without washing your hands first          2  Cover a sneeze or cough  Use a tissue that covers your mouth and nose  Throw the tissue away in a trash can right away  Use the bend of your arm if a tissue is not available  Wash your hands well with soap and water or use a hand   3  Stay away from others while you are sick  Avoid crowds as much as possible  4  Ask about vaccines you may need  Talk to your healthcare provider about your vaccine history  He or she will tell you which vaccines you need, and when to get them  ? Get the influenza (flu) vaccine as soon as recommended each year  The flu vaccine is available starting in September or October  Flu viruses change, so it is important to get a flu vaccine every year  ? Get the pneumonia vaccine if recommended  This vaccine is usually recommended every 5 years  Your provider will tell you when to get this vaccine, if needed  Follow up with your doctor as directed:  Write down your questions so you remember to ask them during your visits  © Copyright [employer] 2022 Information is for End User's use only and may not be sold, redistributed or otherwise used for commercial purposes   All illustrations and images included in CareNotes® are the copyrighted property of [employer]  or [address]  The above information is an  only  It is not intended as medical advice for individual conditions or treatments  Talk to your doctor, nurse or pharmacist before following any medical regimen to see if it is safe and effective for you  Follow up with PCP in 3-5 days  Proceed to  ER if symptoms worsen  Chief Complaint     Chief Complaint   Patient presents with    Generalized Body Aches     Patient relates body aches, chills, pain all over  Not covid vaccinated  History of Present Illness       Generalized Body Aches  The current episode started yesterday  The problem occurs constantly  The problem is unchanged  Associated symptoms include fatigue, abdominal pain and diarrhea  Pertinent negatives include no congestion, decreased vision, double vision, ear discharge, ear pain, eye discharge, eye pain, headaches, hearing loss, mouth sores, rhinorrhea, sore throat, swollen glands, fever, chest pain, coughing, shortness of breath, wheezing, constipation, nausea, vomiting or rash  Past treatments include acetaminophen  The treatment provided moderate relief  The diarrhea is semi-solid  Review of Systems   Review of Systems   Constitutional: Positive for fatigue  Negative for chills and fever  HENT: Negative for congestion, ear discharge, ear pain, hearing loss, mouth sores, postnasal drip, rhinorrhea, sinus pressure, sinus pain and sore throat  Eyes: Negative for double vision, pain and discharge  Respiratory: Negative for cough, chest tightness, shortness of breath and wheezing  Cardiovascular: Negative for chest pain  Gastrointestinal: Positive for abdominal pain and diarrhea  Negative for constipation, nausea and vomiting  Genitourinary: Negative for difficulty urinating  Musculoskeletal: Negative for arthralgias and myalgias  Skin: Negative for rash  Neurological: Negative for dizziness and headaches     Psychiatric/Behavioral: Negative for behavioral problems           Current Medications       Current Outpatient Medications:     amitriptyline (ELAVIL) 10 mg tablet, , Disp: , Rfl:     carisoprodol (SOMA) 350 mg tablet, every 6 (six) hours as needed , Disp: , Rfl:     Cholecalciferol (VITAMIN D3) 125 MCG (5000 UT) CAPS, Take 1 capsule by mouth daily, Disp: , Rfl:     clonazePAM (KlonoPIN) 1 mg tablet, , Disp: , Rfl:     FLUoxetine (PROzac) 40 MG capsule, fluoxetine 40 mg capsule, Disp: , Rfl:     fluticasone (FLONASE) 50 mcg/act nasal spray, 1 spray into each nostril daily, Disp: 18 2 mL, Rfl: 0    hydroxychloroquine (PLAQUENIL) 200 mg tablet, hydroxychloroquine 200 mg tablet, Disp: , Rfl:     methylphenidate (METADATE ER) 10 MG ER tablet, TAKE ONE TABLET BY MOUTH TWICE DAILY IN THE MORNING AND IN THE AFTERNOON, Disp: , Rfl:     Multiple Vitamins-Minerals (MULTIVITAMIN ADULT PO), 1 daily, Disp: , Rfl:     nitrofurantoin (MACROBID) 100 mg capsule, Take 1 capsule (100 mg total) by mouth 2 (two) times a day, Disp: 10 capsule, Rfl: 0    OMEGA-3 FATTY ACIDS PO, Take 1 capsule by mouth daily, Disp: , Rfl:     ondansetron (Zofran ODT) 4 mg disintegrating tablet, Take 1 tablet (4 mg total) by mouth every 6 (six) hours as needed for nausea or vomiting, Disp: 20 tablet, Rfl: 0    saccharomyces boulardii (FLORASTOR) 250 mg capsule, 1 daily, Disp: , Rfl:     temazepam (RESTORIL) 30 mg capsule, temazepam 30 mg capsule, Disp: , Rfl:     ALPRAZolam (XANAX) 0 5 mg tablet, alprazolam 0 5 mg tablet (Patient not taking: Reported on 11/26/2021), Disp: , Rfl:     amoxicillin (AMOXIL) 875 mg tablet, , Disp: , Rfl:     carisoprodol (SOMA) 350 mg tablet, Take 350 mg by mouth (Patient not taking: Reported on 11/26/2021 ), Disp: , Rfl:     clonazePAM (KlonoPIN) 1 mg tablet, clonazepam 1 mg tablet (Patient not taking: Reported on 11/26/2021), Disp: , Rfl:     FLUoxetine (PROzac) 40 MG capsule, TAKE TWO CAPSULES BY MOUTH ONCE EVERY DAY (Patient not taking: Reported on 11/26/2021), Disp: , Rfl:     hydroxychloroquine (PLAQUENIL) 200 mg tablet, , Disp: , Rfl:     liothyronine (CYTOMEL) 5 mcg tablet, , Disp: , Rfl:     nystatin (MYCOSTATIN) ointment, Apply topically 2 (two) times a day (Patient not taking: Reported on 11/20/2020), Disp: 30 g, Rfl: 0    phenazopyridine (PYRIDIUM) 200 mg tablet, Take 1 tablet (200 mg total) by mouth 3 (three) times a day with meals (Patient not taking: Reported on 11/26/2021 ), Disp: 10 tablet, Rfl: 0    temazepam (RESTORIL) 30 mg capsule, temazepam 30 mg capsule (Patient not taking: Reported on 11/26/2021), Disp: , Rfl:     traMADol (ULTRAM) 50 mg tablet, Take 1 tablet (50 mg total) by mouth every 8 (eight) hours as needed for moderate pain (Patient not taking: Reported on 11/20/2020), Disp: 15 tablet, Rfl: 0    traZODone (DESYREL) 50 mg tablet, TAKE 1-2 TABLETS BY MOUTH AT BEDTIME AS NEEDED FOR SLEEP (Patient not taking: Reported on 11/26/2021), Disp: , Rfl:     Current Allergies     Allergies as of 07/15/2022 - Reviewed 07/15/2022   Allergen Reaction Noted    Androgens  09/09/2019    Sulfa antibiotics Other (See Comments) 06/05/2015            The following portions of the patient's history were reviewed and updated as appropriate: allergies, current medications, past family history, past medical history, past social history, past surgical history and problem list      Past Medical History:   Diagnosis Date    Disease of thyroid gland     Fibromyalgia     Lupus ([address] )     Proctitis     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[address] )     Vitiligo        Past Surgical History:   Procedure Laterality Date    ADENOIDECTOMY      APPENDECTOMY      CHOLECYSTECTOMY      OVARIAN CYST SURGERY      TONSILLECTOMY         History reviewed  No pertinent family history  Medications have been verified  Objective   /76   Pulse 78   Temp 97 5 °F (36 4 °C)   Resp 16   SpO2 99%   No LMP recorded   Patient is postmenopausal        Physical Exam     Physical Exam  Vitals and nursing note reviewed  Constitutional:       Appearance: She is well-developed  HENT:      Right Ear: Tympanic membrane and external ear normal       Left Ear: Tympanic membrane and external ear normal    Cardiovascular:      Rate and Rhythm: Normal rate and regular rhythm  Heart sounds: Normal heart sounds, S1 normal and S2 normal  No murmur heard  Pulmonary:      Effort: Pulmonary effort is normal  No respiratory distress  Breath sounds: Normal breath sounds  No wheezing or rales  Chest:      Chest wall: No tenderness  Abdominal:      General: There is no distension  Tenderness: There is no abdominal tenderness  There is no guarding  Musculoskeletal:      Cervical back: Normal range of motion  No edema  Lymphadenopathy:      Cervical: No cervical adenopathy  Skin:     General: Skin is warm and dry  Findings: No rash  Neurological:      Mental Status: She is alert and oriented to person, place, and time     Psychiatric:         Speech: Speech normal          Behavior: Behavior normal

## 2022-07-15 NOTE — PATIENT INSTRUCTIONS
Viral Syndrome   WHAT YOU NEED TO KNOW:   Viral syndrome is a term used for symptoms of an infection caused by a virus  Viruses are spread easily from person to person through the air and on shared items  DISCHARGE INSTRUCTIONS:   Call your local emergency number (911 in the [facility],[address]) or have someone else call if:   You have a seizure  You cannot be woken  You have chest pain or trouble breathing  Return to the emergency department if:   You have a stiff neck, a bad headache, and sensitivity to light  You feel weak, dizzy, or confused  You stop urinating or urinate a lot less than usual     You cough up blood or thick yellow or green mucus  You have severe abdominal pain or your abdomen is larger than usual     Call your doctor if:   Your symptoms do not get better with treatment or get worse after 3 days  You have a rash or ear pain  You have burning when you urinate  You have questions or concerns about your condition or care  Medicines: You may  need any of the following:  Acetaminophen  decreases pain and fever  It is available without a doctor's order  Ask how much to take and how often to take it  Follow directions  Read the labels of all other medicines you are using to see if they also contain acetaminophen, or ask your doctor or pharmacist  Acetaminophen can cause liver damage if not taken correctly  Do not use more than 4 grams (4,000 milligrams) total of acetaminophen in one day  NSAIDs , such as ibuprofen, help decrease swelling, pain, and fever  NSAIDs can cause stomach bleeding or kidney problems in certain people  If you take blood thinner medicine, always ask your healthcare provider if NSAIDs are safe for you  Always read the medicine label and follow directions  Cold medicine  helps decrease swelling, control a cough, and relieve chest or nasal congestion  Saline nasal spray  helps decrease nasal congestion  Take your medicine as directed    Contact your healthcare provider if you think your medicine is not helping or if you have side effects  Tell him of her if you are allergic to any medicine  Keep a list of the medicines, vitamins, and herbs you take  Include the amounts, and when and why you take them  Bring the list or the pill bottles to follow-up visits  Carry your medicine list with you in case of an emergency  Manage your symptoms:   Drink liquids as directed to prevent dehydration  Ask how much liquid to drink each day and which liquids are best for you  Ask if you should drink an oral rehydration solution (ORS)  An ORS has the right amounts of water, salts, and sugar you need to replace body fluids  This may help prevent dehydration caused by vomiting or diarrhea  Do not drink liquids with caffeine  Liquids with caffeine can make dehydration worse  Get plenty of rest to help your body heal   Take naps throughout the day  Ask your healthcare provider when you can return to work and your normal activities  Use a cool mist humidifier to help you breathe easier  Ask your healthcare provider how to use a cool mist humidifier  Eat honey or use cough drops for a sore throat  Cough drops are available without a doctor's order  Follow directions for taking cough drops  Do not smoke or be close to anyone who is smoking  Nicotine and other chemicals in cigarettes and cigars can cause lung damage  Smoking can also delay healing  Ask your healthcare provider for information if you currently smoke and need help to quit  E-cigarettes or smokeless tobacco still contain nicotine  Talk to your healthcare provider before you use these products  Prevent the spread of germs:       Wash your hands often  Wash your hands several times each day  Wash after you use the bathroom, change a child's diaper, and before you prepare or eat food  Use soap and water every time  Rub your soapy hands together, lacing your fingers   Wash the front and back of your hands, and in between your fingers  Use the fingers of one hand to scrub under the fingernails of the other hand  Wash for at least 20 seconds  Rinse with warm, running water for several seconds  Then dry your hands with a clean towel or paper towel  Use hand  that contains alcohol if soap and water are not available  Do not touch your eyes, nose, or mouth without washing your hands first          Cover a sneeze or cough  Use a tissue that covers your mouth and nose  Throw the tissue away in a trash can right away  Use the bend of your arm if a tissue is not available  Wash your hands well with soap and water or use a hand   Stay away from others while you are sick  Avoid crowds as much as possible  Ask about vaccines you may need  Talk to your healthcare provider about your vaccine history  He or she will tell you which vaccines you need, and when to get them  Get the influenza (flu) vaccine as soon as recommended each year  The flu vaccine is available starting in September or October  Flu viruses change, so it is important to get a flu vaccine every year  Get the pneumonia vaccine if recommended  This vaccine is usually recommended every 5 years  Your provider will tell you when to get this vaccine, if needed  Follow up with your doctor as directed:  Write down your questions so you remember to ask them during your visits  © Copyright [employer] 2022 Information is for End User's use only and may not be sold, redistributed or otherwise used for commercial purposes  All illustrations and images included in CareNotes® are the copyrighted property of [employer]  or [employer]   The above information is an  only  It is not intended as medical advice for individual conditions or treatments  Talk to your doctor, nurse or pharmacist before following any medical regimen to see if it is safe and effective for you

## 2022-09-21 ENCOUNTER — OFFICE VISIT (OUTPATIENT)
Dept: URGENT CARE | Age: 57
End: 2022-09-21
Payer: COMMERCIAL

## 2022-09-21 VITALS
DIASTOLIC BLOOD PRESSURE: 80 MMHG | TEMPERATURE: 97.1 F | HEIGHT: 63 IN | RESPIRATION RATE: 18 BRPM | SYSTOLIC BLOOD PRESSURE: 141 MMHG | WEIGHT: 131.6 LBS | HEART RATE: 72 BPM | OXYGEN SATURATION: 99 % | BODY MASS INDEX: 23.32 KG/M2

## 2022-09-21 DIAGNOSIS — R05.9 COUGH: ICD-10-CM

## 2022-09-21 DIAGNOSIS — R52 BODY ACHES: ICD-10-CM

## 2022-09-21 DIAGNOSIS — R30.0 BURNING WITH URINATION: Primary | ICD-10-CM

## 2022-09-21 LAB
SARS-COV-2 AG UPPER RESP QL IA: NEGATIVE
SL AMB  POCT GLUCOSE, UA: NORMAL
SL AMB LEUKOCYTE ESTERASE,UA: NORMAL
SL AMB POCT BILIRUBIN,UA: NORMAL
SL AMB POCT BLOOD,UA: NORMAL
SL AMB POCT CLARITY,UA: CLEAR
SL AMB POCT COLOR,UA: NORMAL
SL AMB POCT KETONES,UA: NORMAL
SL AMB POCT NITRITE,UA: NORMAL
SL AMB POCT PH,UA: 5
SL AMB POCT SPECIFIC GRAVITY,UA: 1
SL AMB POCT URINE PROTEIN: NORMAL
SL AMB POCT UROBILINOGEN: 0.2
VALID CONTROL: NORMAL

## 2022-09-21 PROCEDURE — 87811 SARS-COV-2 COVID19 W/OPTIC: CPT | Performed by: NURSE PRACTITIONER

## 2022-09-21 PROCEDURE — 81002 URINALYSIS NONAUTO W/O SCOPE: CPT | Performed by: NURSE PRACTITIONER

## 2022-09-21 PROCEDURE — 87086 URINE CULTURE/COLONY COUNT: CPT | Performed by: NURSE PRACTITIONER

## 2022-09-21 PROCEDURE — 99213 OFFICE O/P EST LOW 20 MIN: CPT | Performed by: NURSE PRACTITIONER

## 2022-09-21 RX ORDER — KETOROLAC TROMETHAMINE 30 MG/ML
30 INJECTION, SOLUTION INTRAMUSCULAR; INTRAVENOUS ONCE
Status: COMPLETED | OUTPATIENT
Start: 2022-09-21 | End: 2022-09-21

## 2022-09-21 RX ADMIN — KETOROLAC TROMETHAMINE 30 MG: 30 INJECTION, SOLUTION INTRAMUSCULAR; INTRAVENOUS at 16:40

## 2022-09-21 NOTE — LETTER
September 21, 2022     Patient: Lisha Chaudhary  YOB: 1965  Date of Visit: 9/21/2022      To Whom it May Concern:    [employer] is under my professional care  Virgie Vila was seen in my office on 9/21/2022  Virgie Vila may return to work on 09/22/2022 if symptoms improve, if not please excuse till next working day  Pt tested negative for COVID today          Sincerely,          Holland Cooks, CRNP        CC: No Recipients

## 2022-09-21 NOTE — PROGRESS NOTES
NAME: Nael Storm is a 62 y o  female  : 1965    MRN: [id number]    /80   Pulse 72   Temp (!) 97 1 °F (36 2 °C) (Tympanic)   Resp 18   Ht 5' 3" (1 6 m)   Wt 59 7 kg (131 lb 9 6 oz)   SpO2 99%   BMI 23 31 kg/m²     Assessment and Plan   Burning with urination [R30 0]  1  Burning with urination  POCT urine dip    Urine culture   2  Cough  Poct Covid 19 Rapid Antigen Test   3  Body aches  ketorolac (TORADOL) injection 30 mg    Urine culture     Administrations This Visit     ketorolac (TORADOL) injection 30 mg     Admin Date  2022 Action  Given Dose  30 mg Route  Intramuscular Administered By  Declan Mcgee RN              Breana Lacey was seen today for cough  Diagnoses and all orders for this visit:    Burning with urination  -     POCT urine dip  -     Urine culture    Cough  -     Poct Covid 19 Rapid Antigen Test    Body aches  -     ketorolac (TORADOL) injection 30 mg  -     Urine culture        Patient Instructions     Patient Instructions   Follow up with PCP   Urine dip was normal, no infection noted,    Take tylenol and motrin     Dont use motrin or other NSAIDS for toradol given to you in the office today     If symptoms worsen need to go to the ER     Rapid COVID test was negative today       Proceed to the nearest ER if symptoms worsen, Follow up with your PCP  Continue to social distance, wash your hands, and wear your masks  Please continue to follow the CDC  gov guidelines daily for they are subject to change on COVID-19    Chief Complaint     Chief Complaint   Patient presents with    Cough     Cough, nausea, diarrhea, headache, body aches, burning with urination  Sx for about one week  History of Present Illness     61 yo F here today with multiple complaints  Patient states that for over a week she has had body aches has not been feeling well and recently returned from the [address]   She has been taking over-the-counter Tylenol with some relief and taking arthritis medications  She takes Allegra daily has tried Vicks and she is out of her Flonase  She is aware to start using that again  She denies any cough for shortness of breath but does states she has burning with urination  Urine dip will be done today  She has no nausea or vomiting and no back pain however she wanted me to look at a rash on her lower back and not sure if she had a cyst   Buttocks lower back and pilonidal area were examined and no rash cyst or lump present  Patient made aware  She also complains of having a blister on the inside of her left lip that she has been using Abreva for and just wanted me to verify that was okay to use  Review of Systems   Review of Systems   Constitutional: Positive for fatigue  Negative for fever  HENT: Positive for congestion, postnasal drip, rhinorrhea and sore throat  Negative for ear pain and sinus pain  Respiratory: Positive for cough  Cardiovascular: Negative  Gastrointestinal: Negative  Genitourinary: Positive for dysuria, frequency and urgency  Negative for difficulty urinating and hematuria  Musculoskeletal: Positive for arthralgias and myalgias  Negative for gait problem and joint swelling  Neurological: Positive for headaches           Current Medications       Current Outpatient Medications:     FLUoxetine (PROzac) 40 MG capsule, fluoxetine 40 mg capsule, Disp: , Rfl:     fluticasone (FLONASE) 50 mcg/act nasal spray, 1 spray into each nostril daily, Disp: 18 2 mL, Rfl: 0    hydroxychloroquine (PLAQUENIL) 200 mg tablet, hydroxychloroquine 200 mg tablet, Disp: , Rfl:     Multiple Vitamins-Minerals (MULTIVITAMIN ADULT PO), 1 daily, Disp: , Rfl:     nitrofurantoin (MACROBID) 100 mg capsule, Take 1 capsule (100 mg total) by mouth 2 (two) times a day, Disp: 10 capsule, Rfl: 0    OMEGA-3 FATTY ACIDS PO, Take 1 capsule by mouth daily, Disp: , Rfl:     saccharomyces boulardii (FLORASTOR) 250 mg capsule, 1 daily, Disp: , Rfl:    temazepam (RESTORIL) 30 mg capsule, temazepam 30 mg capsule, Disp: , Rfl:     ALPRAZolam (XANAX) 0 5 mg tablet, alprazolam 0 5 mg tablet (Patient not taking: No sig reported), Disp: , Rfl:     amitriptyline (ELAVIL) 10 mg tablet, , Disp: , Rfl:     amoxicillin (AMOXIL) 875 mg tablet, , Disp: , Rfl:     carisoprodol (SOMA) 350 mg tablet, every 6 (six) hours as needed  (Patient not taking: Reported on 9/21/2022), Disp: , Rfl:     carisoprodol (SOMA) 350 mg tablet, Take 350 mg by mouth (Patient not taking: No sig reported), Disp: , Rfl:     Cholecalciferol (VITAMIN D3) 125 MCG (5000 UT) CAPS, Take 1 capsule by mouth daily, Disp: , Rfl:     clonazePAM (KlonoPIN) 1 mg tablet, , Disp: , Rfl:     clonazePAM (KlonoPIN) 1 mg tablet, clonazepam 1 mg tablet (Patient not taking: Reported on 11/26/2021), Disp: , Rfl:     FLUoxetine (PROzac) 40 MG capsule, TAKE TWO CAPSULES BY MOUTH ONCE EVERY DAY (Patient not taking: Reported on 11/26/2021), Disp: , Rfl:     hydroxychloroquine (PLAQUENIL) 200 mg tablet, , Disp: , Rfl:     liothyronine (CYTOMEL) 5 mcg tablet, , Disp: , Rfl:     methylphenidate (METADATE ER) 10 MG ER tablet, TAKE ONE TABLET BY MOUTH TWICE DAILY IN THE MORNING AND IN THE AFTERNOON (Patient not taking: Reported on 9/21/2022), Disp: , Rfl:     nystatin (MYCOSTATIN) ointment, Apply topically 2 (two) times a day (Patient not taking: Reported on 11/20/2020), Disp: 30 g, Rfl: 0    ondansetron (Zofran ODT) 4 mg disintegrating tablet, Take 1 tablet (4 mg total) by mouth every 6 (six) hours as needed for nausea or vomiting (Patient not taking: Reported on 9/21/2022), Disp: 20 tablet, Rfl: 0    phenazopyridine (PYRIDIUM) 200 mg tablet, Take 1 tablet (200 mg total) by mouth 3 (three) times a day with meals (Patient not taking: Reported on 11/26/2021 ), Disp: 10 tablet, Rfl: 0    temazepam (RESTORIL) 30 mg capsule, temazepam 30 mg capsule (Patient not taking: Reported on 11/26/2021), Disp: , Rfl:     traMADol (ULTRAM) 50 mg tablet, Take 1 tablet (50 mg total) by mouth every 8 (eight) hours as needed for moderate pain (Patient not taking: Reported on 9/21/2022), Disp: 15 tablet, Rfl: 0    traZODone (DESYREL) 50 mg tablet, TAKE 1-2 TABLETS BY MOUTH AT BEDTIME AS NEEDED FOR SLEEP (Patient not taking: Reported on 11/26/2021), Disp: , Rfl:   No current facility-administered medications for this visit  Current Allergies     Allergies as of 09/21/2022 - Reviewed 09/21/2022   Allergen Reaction Noted    Androgens  09/09/2019    Sulfa antibiotics Other (See Comments) 06/05/2015              Past Medical History:   Diagnosis Date    Disease of thyroid gland     Fibromyalgia     Lupus ([address] )     Proctitis     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HCC)     Vitiligo        Past Surgical History:   Procedure Laterality Date    ADENOIDECTOMY      APPENDECTOMY      CHOLECYSTECTOMY      OVARIAN CYST SURGERY      TONSILLECTOMY         History reviewed  No pertinent family history  Medications have been verified  The following portions of the patient's history were reviewed and updated as appropriate: allergies, current medications, past family history, past medical history, past social history, past surgical history and problem list     Objective   /80   Pulse 72   Temp (!) 97 1 °F (36 2 °C) (Tympanic)   Resp 18   Ht 5' 3" (1 6 m)   Wt 59 7 kg (131 lb 9 6 oz)   SpO2 99%   BMI 23 31 kg/m²      Physical Exam     Physical Exam  Constitutional:       General: She is awake  She is not in acute distress  Appearance: Normal appearance  She is well-developed  She is not ill-appearing  HENT:      Head: Normocephalic  Right Ear: Hearing, tympanic membrane, ear canal and external ear normal       Left Ear: Hearing, tympanic membrane, ear canal and external ear normal       Nose: Congestion and rhinorrhea present  No mucosal edema  Right Turbinates: Swollen  Left Turbinates: Swollen        Right Sinus: No maxillary sinus tenderness  Left Sinus: No maxillary sinus tenderness  Mouth/Throat:      Lips: Pink  Mouth: Mucous membranes are moist       Pharynx: Uvula midline  No pharyngeal swelling, oropharyngeal exudate or posterior oropharyngeal erythema  Tonsils: No tonsillar exudate  Comments: feverblister on the lower left side of lower lip, healing well  Eyes:      Pupils: Pupils are equal, round, and reactive to light  Cardiovascular:      Rate and Rhythm: Normal rate and regular rhythm  Heart sounds: Normal heart sounds  Pulmonary:      Effort: Pulmonary effort is normal  No respiratory distress  Breath sounds: Normal breath sounds and air entry  No stridor  No decreased breath sounds, wheezing, rhonchi or rales  Musculoskeletal:      Comments: toradol IM given in the office for body aches     Skin:     General: Skin is warm  Capillary Refill: Capillary refill takes less than 2 seconds  Findings: No signs of injury, lesion or rash  Neurological:      General: No focal deficit present  Mental Status: She is alert and oriented to person, place, and time  Psychiatric:         Attention and Perception: Attention normal          Mood and Affect: Mood normal          Behavior: Behavior is hyperactive  Behavior is cooperative  Note: Portions of this record may have been created with voice recognition software  Occasional wrong word or "sound a like" substitutions may have occurred due to the inherent limitations of voice recognition software  Please read the chart carefully and recognize, using context, where substitutions have occurred  SHAY Craven

## 2022-09-21 NOTE — PATIENT INSTRUCTIONS
Follow up with PCP   Urine dip was normal, no infection noted,    Take tylenol and motrin     Dont use motrin or other NSAIDS for toradol given to you in the office today     If symptoms worsen need to go to the ER     Rapid COVID test was negative today

## 2022-09-22 LAB — BACTERIA UR CULT: NORMAL

## 2022-09-23 ENCOUNTER — OFFICE VISIT (OUTPATIENT)
Dept: URGENT CARE | Age: 57
End: 2022-09-23
Payer: COMMERCIAL

## 2022-09-23 VITALS
HEART RATE: 72 BPM | OXYGEN SATURATION: 98 % | TEMPERATURE: 96.8 F | SYSTOLIC BLOOD PRESSURE: 118 MMHG | RESPIRATION RATE: 18 BRPM | DIASTOLIC BLOOD PRESSURE: 74 MMHG

## 2022-09-23 DIAGNOSIS — J01.10 ACUTE NON-RECURRENT FRONTAL SINUSITIS: Primary | ICD-10-CM

## 2022-09-23 PROCEDURE — 99213 OFFICE O/P EST LOW 20 MIN: CPT

## 2022-09-23 RX ORDER — FLUTICASONE PROPIONATE 50 MCG
1 SPRAY, SUSPENSION (ML) NASAL DAILY
Qty: 11.1 ML | Refills: 0 | Status: SHIPPED | OUTPATIENT
Start: 2022-09-23

## 2022-09-23 RX ORDER — AMOXICILLIN AND CLAVULANATE POTASSIUM 875; 125 MG/1; MG/1
1 TABLET, FILM COATED ORAL EVERY 12 HOURS SCHEDULED
Qty: 14 TABLET | Refills: 0 | Status: SHIPPED | OUTPATIENT
Start: 2022-09-23 | End: 2022-09-23

## 2022-09-23 RX ORDER — AMOXICILLIN AND CLAVULANATE POTASSIUM 875; 125 MG/1; MG/1
1 TABLET, FILM COATED ORAL EVERY 12 HOURS SCHEDULED
Qty: 14 TABLET | Refills: 0 | Status: SHIPPED | OUTPATIENT
Start: 2022-09-23 | End: 2022-09-30

## 2022-09-23 RX ORDER — ONDANSETRON 4 MG/1
4 TABLET, FILM COATED ORAL EVERY 8 HOURS PRN
Qty: 6 TABLET | Refills: 0 | Status: SHIPPED | OUTPATIENT
Start: 2022-09-23

## 2022-09-23 NOTE — LETTER
September 23, 2022     Patient: Cesario Ly   YOB: 1965   Date of Visit: 9/23/2022       To Whom it May Concern:    Samir Ronquillo was seen in my clinic on 9/23/2022            Sincerely,              Lata Cullen

## 2022-09-23 NOTE — PROGRESS NOTES
[facility]        NAME: Edwina Zepeda is a 62 y o  female  : 1965    MRN: [id number]  DATE: 2022  TIME: 5:00 PM    Assessment and Plan   Acute non-recurrent frontal sinusitis [J01 10]  1  Acute non-recurrent frontal sinusitis  amoxicillin-clavulanate (AUGMENTIN) 875-125 mg per tablet    fluticasone (FLONASE) 50 mcg/act nasal spray    ondansetron (ZOFRAN) 4 mg tablet         Patient Instructions     Discussed likelihood of sinusitis being viral in nature  Pt would like to start treatment in s/o Lupus  Discussed risks/benefits of antibiotic therapy and GI side effects of Augmentin  Pt in agreement  Asked for antiemetic - states she is nauseous and having trouble eating  F/u with PCP in 3-5 days  Proceed to ER if symptoms worsen  Chief Complaint     Chief Complaint   Patient presents with    Nasal Congestion     Patient states she had started feeling better, but now has green mucus and nausea  History of Present Illness     62 y o  F presents with complaints of headache, sinus pressure, earache, rhinorrhea, nausea and body aches x 1 week  States she was down in [place] and started feeling sick  PMH lupus  Taking Mucinex and Nyquil OTC  Denies sick contacts  Rapid COVID testing negative 2 days ago  Denies CP, SOB  Denies fevers or chills  Review of Systems   Review of Systems   Constitutional: Negative for chills, fatigue and fever  HENT: Positive for ear pain, rhinorrhea and sinus pressure  Negative for congestion, facial swelling, hearing loss, sneezing, sore throat and trouble swallowing  Eyes: Negative for pain, redness and visual disturbance  Respiratory: Negative for cough, chest tightness, shortness of breath and wheezing  Cardiovascular: Negative for chest pain and palpitations  Gastrointestinal: Positive for nausea  Negative for abdominal pain, diarrhea and vomiting  Genitourinary: Negative for dysuria, flank pain, hematuria and pelvic pain  Musculoskeletal: Positive for myalgias  Negative for arthralgias and back pain  Skin: Negative for color change and rash  Neurological: Positive for headaches  Negative for dizziness, seizures, syncope, weakness and light-headedness  Psychiatric/Behavioral: Negative for confusion, hallucinations and sleep disturbance  The patient is not nervous/anxious  All other systems reviewed and are negative          Current Medications       Current Outpatient Medications:     amoxicillin-clavulanate (AUGMENTIN) 875-125 mg per tablet, Take 1 tablet by mouth every 12 (twelve) hours for 7 days, Disp: 14 tablet, Rfl: 0    Cholecalciferol (VITAMIN D3) 125 MCG (5000 UT) CAPS, Take 1 capsule by mouth daily, Disp: , Rfl:     clonazePAM (KlonoPIN) 1 mg tablet, , Disp: , Rfl:     FLUoxetine (PROzac) 40 MG capsule, fluoxetine 40 mg capsule, Disp: , Rfl:     fluticasone (FLONASE) 50 mcg/act nasal spray, 1 spray into each nostril daily, Disp: 18 2 mL, Rfl: 0    fluticasone (FLONASE) 50 mcg/act nasal spray, 1 spray into each nostril daily, Disp: 11 1 mL, Rfl: 0    hydroxychloroquine (PLAQUENIL) 200 mg tablet, hydroxychloroquine 200 mg tablet, Disp: , Rfl:     Multiple Vitamins-Minerals (MULTIVITAMIN ADULT PO), 1 daily, Disp: , Rfl:     OMEGA-3 FATTY ACIDS PO, Take 1 capsule by mouth daily, Disp: , Rfl:     ondansetron (ZOFRAN) 4 mg tablet, Take 1 tablet (4 mg total) by mouth every 8 (eight) hours as needed for nausea or vomiting, Disp: 6 tablet, Rfl: 0    saccharomyces boulardii (FLORASTOR) 250 mg capsule, 1 daily, Disp: , Rfl:     temazepam (RESTORIL) 30 mg capsule, temazepam 30 mg capsule, Disp: , Rfl:     ALPRAZolam (XANAX) 0 5 mg tablet, alprazolam 0 5 mg tablet (Patient not taking: No sig reported), Disp: , Rfl:     amitriptyline (ELAVIL) 10 mg tablet, , Disp: , Rfl:     amoxicillin (AMOXIL) 875 mg tablet, , Disp: , Rfl:     carisoprodol (SOMA) 350 mg tablet, every 6 (six) hours as needed  (Patient not taking: No sig reported), Disp: , Rfl:     carisoprodol (SOMA) 350 mg tablet, Take 350 mg by mouth (Patient not taking: No sig reported), Disp: , Rfl:     clonazePAM (KlonoPIN) 1 mg tablet, clonazepam 1 mg tablet (Patient not taking: No sig reported), Disp: , Rfl:     FLUoxetine (PROzac) 40 MG capsule, TAKE TWO CAPSULES BY MOUTH ONCE EVERY DAY (Patient not taking: No sig reported), Disp: , Rfl:     hydroxychloroquine (PLAQUENIL) 200 mg tablet, , Disp: , Rfl:     liothyronine (CYTOMEL) 5 mcg tablet, , Disp: , Rfl:     methylphenidate (METADATE ER) 10 MG ER tablet, TAKE ONE TABLET BY MOUTH TWICE DAILY IN THE MORNING AND IN THE AFTERNOON (Patient not taking: No sig reported), Disp: , Rfl:     nitrofurantoin (MACROBID) 100 mg capsule, Take 1 capsule (100 mg total) by mouth 2 (two) times a day, Disp: 10 capsule, Rfl: 0    nystatin (MYCOSTATIN) ointment, Apply topically 2 (two) times a day (Patient not taking: No sig reported), Disp: 30 g, Rfl: 0    ondansetron (Zofran ODT) 4 mg disintegrating tablet, Take 1 tablet (4 mg total) by mouth every 6 (six) hours as needed for nausea or vomiting (Patient not taking: No sig reported), Disp: 20 tablet, Rfl: 0    phenazopyridine (PYRIDIUM) 200 mg tablet, Take 1 tablet (200 mg total) by mouth 3 (three) times a day with meals (Patient not taking: No sig reported), Disp: 10 tablet, Rfl: 0    temazepam (RESTORIL) 30 mg capsule, temazepam 30 mg capsule (Patient not taking: No sig reported), Disp: , Rfl:     traMADol (ULTRAM) 50 mg tablet, Take 1 tablet (50 mg total) by mouth every 8 (eight) hours as needed for moderate pain (Patient not taking: No sig reported), Disp: 15 tablet, Rfl: 0    traZODone (DESYREL) 50 mg tablet, TAKE 1-2 TABLETS BY MOUTH AT BEDTIME AS NEEDED FOR SLEEP (Patient not taking: No sig reported), Disp: , Rfl:     Current Allergies     Allergies as of 09/23/2022 - Reviewed 09/23/2022   Allergen Reaction Noted    Androgens  09/09/2019    Sulfa antibiotics Other (See Comments) 06/05/2015            The following portions of the patient's history were reviewed and updated as appropriate: allergies, current medications, past family history, past medical history, past social history, past surgical history and problem list      Past Medical History:   Diagnosis Date    Disease of thyroid gland     Fibromyalgia     [address] [facility] [address]     Proctitis     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[facility] [address] [facility]     Vitiligo        Past Surgical History:   Procedure Laterality Date    ADENOIDECTOMY      APPENDECTOMY      CHOLECYSTECTOMY      OVARIAN CYST SURGERY      TONSILLECTOMY         History reviewed  No pertinent family history  Medications have been verified  Objective   /74   Pulse 72   Temp (!) 96 8 °F (36 °C)   Resp 18   SpO2 98%   No LMP recorded  Patient is postmenopausal        Physical Exam     Physical Exam  Vitals reviewed  Constitutional:       General: She is not in acute distress  Appearance: Normal appearance  She is not toxic-appearing  HENT:      Head: Normocephalic  Right Ear: Tympanic membrane normal  No middle ear effusion  Tympanic membrane is not erythematous or bulging  Left Ear: Tympanic membrane normal   No middle ear effusion  Tympanic membrane is not erythematous or bulging  Nose: Congestion present  No rhinorrhea  Right Sinus: Frontal sinus tenderness present  No maxillary sinus tenderness  Left Sinus: Frontal sinus tenderness present  No maxillary sinus tenderness  Comments: +frontal sinus tap/tenderness     Mouth/Throat:      Pharynx: Oropharynx is clear  Uvula midline  No oropharyngeal exudate, posterior oropharyngeal erythema or uvula swelling  Tonsils: No tonsillar exudate or tonsillar abscesses  Eyes:      Extraocular Movements: Extraocular movements intact  Conjunctiva/sclera: Conjunctivae normal       Pupils: Pupils are equal, round, and reactive to light     Cardiovascular: Rate and Rhythm: Normal rate and regular rhythm  Pulses: Normal pulses  Heart sounds: Normal heart sounds  Pulmonary:      Effort: Pulmonary effort is normal  No tachypnea, accessory muscle usage or respiratory distress  Breath sounds: Normal breath sounds and air entry  No decreased air movement  No decreased breath sounds, wheezing, rhonchi or rales  Abdominal:      General: Bowel sounds are normal       Palpations: Abdomen is soft  Tenderness: There is no abdominal tenderness  There is no right CVA tenderness or guarding  Musculoskeletal:         General: Normal range of motion  Cervical back: Normal range of motion  Lymphadenopathy:      Cervical: Cervical adenopathy present  Skin:     General: Skin is warm and dry  Neurological:      General: No focal deficit present  Mental Status: She is alert  Cranial Nerves: Cranial nerves are intact  Sensory: Sensation is intact  Motor: Motor function is intact  Coordination: Coordination is intact  Gait: Gait is intact  Deep Tendon Reflexes: Reflexes are normal and symmetric

## 2022-11-16 ENCOUNTER — OFFICE VISIT (OUTPATIENT)
Dept: URGENT CARE | Age: 57
End: 2022-11-16

## 2022-11-16 VITALS
RESPIRATION RATE: 20 BRPM | HEIGHT: 64 IN | BODY MASS INDEX: 22.02 KG/M2 | DIASTOLIC BLOOD PRESSURE: 78 MMHG | OXYGEN SATURATION: 99 % | SYSTOLIC BLOOD PRESSURE: 128 MMHG | WEIGHT: 129 LBS | TEMPERATURE: 97.6 F | HEART RATE: 59 BPM

## 2022-11-16 DIAGNOSIS — J01.10 ACUTE FRONTAL SINUSITIS, RECURRENCE NOT SPECIFIED: Primary | ICD-10-CM

## 2022-11-16 RX ORDER — AZITHROMYCIN 250 MG/1
TABLET, FILM COATED ORAL
Qty: 6 TABLET | Refills: 0 | Status: SHIPPED | OUTPATIENT
Start: 2022-11-16 | End: 2022-11-20

## 2022-11-16 RX ORDER — LIDOCAINE 50 MG/G
PATCH TOPICAL
COMMUNITY
Start: 2022-11-02

## 2022-11-16 NOTE — LETTER
November 16, 2022     Patient: Doyle Soria   YOB: 1965   Date of Visit: 11/16/2022       To Whom it May Concern:    Dayne Jianggabiaftab was seen in my clinic on 11/16/2022  She may return to work on 11/19/2022  If you have any questions or concerns, please don't hesitate to call           Sincerely,          Josie Pitt PA-C        CC: No Recipients

## 2022-11-16 NOTE — PROGRESS NOTES
[facility] [facility]        NAME: Kel Burton is a 62 y o  female  : 1965    MRN: [id number]  DATE: 2022  TIME: 6:23 PM    Assessment and Plan   Acute frontal sinusitis, recurrence not specified [J01 10]  1  Acute frontal sinusitis, recurrence not specified  azithromycin (ZITHROMAX) 250 mg tablet            Patient Instructions     Patient Instructions     Hydration and rest  Tylenol and motrin for pain  Continue cold and sinus medication otc  Nasal saline spray  Start azithromycin  Follow up with Primary Care Physician  Return to clinic or go to the nearest Emergency Department with new or worsening symptoms as discussed  Sinusitis   WHAT YOU NEED TO KNOW:   Sinusitis is inflammation or infection of your sinuses  Sinusitis is most often caused by a virus  Acute sinusitis may last up to 12 weeks  Chronic sinusitis lasts longer than 12 weeks  Recurrent sinusitis means you have 4 or more infections in 1 year  DISCHARGE INSTRUCTIONS:   Return to the emergency department if:   · You have trouble breathing or wheezing that is getting worse  · You have a stiff neck, a fever, or a bad headache  · You cannot open your eye  · Your eyeball bulges out or you cannot move your eye  · You are more sleepy than normal, or you notice changes in your ability to think, move, or talk  · You have swelling of your forehead or scalp  Call your doctor if:   · You have vision changes, such as double vision  · Your eye and eyelid are red, swollen, and painful  · Your symptoms do not improve or go away after 10 days  · You have nausea and are vomiting  · Your nose is bleeding  · You have questions or concerns about your condition or care  Medicines: Your symptoms may go away on their own  Your healthcare provider may recommend watchful waiting for up to 10 days before starting antibiotics   You may need any of the following:  · Acetaminophen decreases pain and fever  It is available without a doctor's order  Ask how much to take and how often to take it  Follow directions  Read the labels of all other medicines you are using to see if they also contain acetaminophen, or ask your doctor or pharmacist  Acetaminophen can cause liver damage if not taken correctly  Do not use more than 4 grams (4,000 milligrams) total of acetaminophen in one day  · NSAIDs , such as ibuprofen, help decrease swelling, pain, and fever  This medicine is available with or without a doctor's order  NSAIDs can cause stomach bleeding or kidney problems in certain people  If you take blood thinner medicine, always ask your healthcare provider if NSAIDs are safe for you  Always read the medicine label and follow directions  · Nasal steroid sprays  may help decrease inflammation in your nose and sinuses  · Decongestants  help reduce swelling and drain mucus in the nose and sinuses  They may help you breathe easier  · Antihistamines  help dry mucus in the nose and relieve sneezing  · Antibiotics  help treat or prevent a bacterial infection  · Take your medicine as directed  Contact your healthcare provider if you think your medicine is not helping or if you have side effects  Tell him or her if you are allergic to any medicine  Keep a list of the medicines, vitamins, and herbs you take  Include the amounts, and when and why you take them  Bring the list or the pill bottles to follow-up visits  Carry your medicine list with you in case of an emergency  Self-care:   · Rinse your sinuses as directed  Use a sinus rinse device to rinse your nasal passages with a saline (salt water) solution or distilled water  Do not use tap water  This will help thin the mucus in your nose and rinse away pollen and dirt  It will also help reduce swelling so you can breathe normally  · Use a humidifier  to increase air moisture in your home   This may make it easier for you to breathe and help decrease your cough  · Sleep with your head elevated  Place an extra pillow under your head before you go to sleep to help your sinuses drain  · Drink liquids as directed  Ask your healthcare provider how much liquid to drink each day and which liquids are best for you  Liquids will thin the mucus in your nose and help it drain  Avoid drinks that contain alcohol or caffeine  · Do not smoke, and avoid secondhand smoke  Nicotine and other chemicals in cigarettes and cigars can make your symptoms worse  Ask your healthcare provider for information if you currently smoke and need help to quit  E-cigarettes or smokeless tobacco still contain nicotine  Talk to your healthcare provider before you use these products  Prevent the spread of germs:   · Wash your hands often with soap and water  Wash your hands after you use the bathroom, change a child's diaper, or sneeze  Wash your hands before you prepare or eat food  · Stay away from people who are sick  Some germs spread easily and quickly through contact  Follow up with your doctor as directed: You may be referred to an ear, nose, and throat specialist  Write down your questions so you remember to ask them during your visits  © Copyright [employer] 2022 Information is for End User's use only and may not be sold, redistributed or otherwise used for commercial purposes  All illustrations and images included in CareNotes® are the copyrighted property of [employer] M [employer]  or [employer]  The above information is an  only  It is not intended as medical advice for individual conditions or treatments  Talk to your doctor, nurse or pharmacist before following any medical regimen to see if it is safe and effective for you  **Portions of the record may have been created with voice recognition software    Occasional wrong word or "sound a like" substitutions may have occurred due to the inherent limitations of voice recognition software  Read the chart carefully and recognize, using context, where substitutions have occurred  **     Chief Complaint     Chief Complaint   Patient presents with   • Cold Like Symptoms     Pt reports two week hx of ear pain, sore throat, fatigue, body aches, sinus pain/pressure burning in nose  Attempted Mucinex Sinus, Vitamin C  No rapid Covid test performed  Hx of lupus  History of Present Illness     Elizabeth Cordero is a 62 y o  female presents to clinic today with complaints of headache, sinus pressure, fatigue, sore throat and bodyaches x 2 weeks  Symptoms worsening over the past 2 days  She has been treating a cold with otc decongestants with some relief  Denies fever, chills, n/v/d, cough, sob, cp  Review of Systems     Review of Systems   Constitutional: Positive for fatigue  Negative for chills and fever  HENT: Positive for congestion, ear pain, postnasal drip, sinus pressure, sinus pain and sore throat  Negative for rhinorrhea and voice change  Eyes: Negative for discharge and redness  Respiratory: Negative for cough, shortness of breath and wheezing  Cardiovascular: Negative for chest pain  Gastrointestinal: Negative for diarrhea, nausea and vomiting  Musculoskeletal: Positive for myalgias  Neurological: Positive for headaches  Negative for dizziness  Hematological: Negative for adenopathy           Current Medications     Current Outpatient Medications:   •  azithromycin (ZITHROMAX) 250 mg tablet, Take 2 tablets today then 1 tablet daily x 4 days, Disp: 6 tablet, Rfl: 0  •  Cholecalciferol (VITAMIN D3) 125 MCG (5000 UT) CAPS, Take 1 capsule by mouth daily, Disp: , Rfl:   •  clonazePAM (KlonoPIN) 1 mg tablet, , Disp: , Rfl:   •  FLUoxetine (PROzac) 40 MG capsule, fluoxetine 40 mg capsule, Disp: , Rfl:   •  hydroxychloroquine (PLAQUENIL) 200 mg tablet, hydroxychloroquine 200 mg tablet, Disp: , Rfl:   •  Multiple Vitamins-Minerals (MULTIVITAMIN ADULT PO), 1 daily, Disp: , Rfl:   •  OMEGA-3 FATTY ACIDS PO, Take 1 capsule by mouth daily, Disp: , Rfl:   •  temazepam (RESTORIL) 30 mg capsule, temazepam 30 mg capsule, Disp: , Rfl:   •  ALPRAZolam (XANAX) 0 5 mg tablet, alprazolam 0 5 mg tablet (Patient not taking: No sig reported), Disp: , Rfl:   •  amitriptyline (ELAVIL) 10 mg tablet, , Disp: , Rfl:   •  carisoprodol (SOMA) 350 mg tablet, every 6 (six) hours as needed  (Patient not taking: No sig reported), Disp: , Rfl:   •  carisoprodol (SOMA) 350 mg tablet, Take 350 mg by mouth (Patient not taking: No sig reported), Disp: , Rfl:   •  clonazePAM (KlonoPIN) 1 mg tablet, clonazepam 1 mg tablet (Patient not taking: No sig reported), Disp: , Rfl:   •  FLUoxetine (PROzac) 40 MG capsule, TAKE TWO CAPSULES BY MOUTH ONCE EVERY DAY (Patient not taking: No sig reported), Disp: , Rfl:   •  fluticasone (FLONASE) 50 mcg/act nasal spray, 1 spray into each nostril daily, Disp: 18 2 mL, Rfl: 0  •  fluticasone (FLONASE) 50 mcg/act nasal spray, 1 spray into each nostril daily (Patient not taking: Reported on 11/16/2022), Disp: 11 1 mL, Rfl: 0  •  hydroxychloroquine (PLAQUENIL) 200 mg tablet, , Disp: , Rfl:   •  lidocaine (LIDODERM) 5 %, PLACE 1 PATCH ON THE SKIN DAILY   REMOVE & DISCARD PATCH WITHIN 12 HOURS OR AS DIRECTED BY MD, Disp: , Rfl:   •  liothyronine (CYTOMEL) 5 mcg tablet, , Disp: , Rfl:   •  methylphenidate (METADATE ER) 10 MG ER tablet, TAKE ONE TABLET BY MOUTH TWICE DAILY IN THE MORNING AND IN THE AFTERNOON (Patient not taking: Reported on 9/21/2022), Disp: , Rfl:   •  nitrofurantoin (MACROBID) 100 mg capsule, Take 1 capsule (100 mg total) by mouth 2 (two) times a day (Patient not taking: Reported on 11/16/2022), Disp: 10 capsule, Rfl: 0  •  nystatin (MYCOSTATIN) ointment, Apply topically 2 (two) times a day (Patient not taking: No sig reported), Disp: 30 g, Rfl: 0  •  ondansetron (Zofran ODT) 4 mg disintegrating tablet, Take 1 tablet (4 mg total) by mouth every 6 (six) hours as needed for nausea or vomiting (Patient not taking: No sig reported), Disp: 20 tablet, Rfl: 0  •  ondansetron (ZOFRAN) 4 mg tablet, Take 1 tablet (4 mg total) by mouth every 8 (eight) hours as needed for nausea or vomiting (Patient not taking: Reported on 11/16/2022), Disp: 6 tablet, Rfl: 0  •  phenazopyridine (PYRIDIUM) 200 mg tablet, Take 1 tablet (200 mg total) by mouth 3 (three) times a day with meals (Patient not taking: Reported on 11/26/2021), Disp: 10 tablet, Rfl: 0  •  saccharomyces boulardii (FLORASTOR) 250 mg capsule, 1 daily (Patient not taking: Reported on 11/16/2022), Disp: , Rfl:   •  temazepam (RESTORIL) 30 mg capsule, temazepam 30 mg capsule (Patient not taking: No sig reported), Disp: , Rfl:   •  traMADol (ULTRAM) 50 mg tablet, Take 1 tablet (50 mg total) by mouth every 8 (eight) hours as needed for moderate pain (Patient not taking: Reported on 9/21/2022), Disp: 15 tablet, Rfl: 0  •  traZODone (DESYREL) 50 mg tablet, TAKE 1-2 TABLETS BY MOUTH AT BEDTIME AS NEEDED FOR SLEEP (Patient not taking: No sig reported), Disp: , Rfl:     Current Allergies     Allergies as of 11/16/2022 - Reviewed 11/16/2022   Allergen Reaction Noted   • Androgens  09/09/2019   • Sulfa antibiotics Other (See Comments) 06/05/2015            The following portions of the patient's history were reviewed and updated as appropriate: allergies, current medications, past family history, past medical history, past social history, past surgical history and problem list      Past Medical History:   Diagnosis Date   • Disease of thyroid gland    • Fibromyalgia    • Lupus ([address] )    • Proctitis    • 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[address] )    • Vitiligo        Past Surgical History:   Procedure Laterality Date   • ADENOIDECTOMY     • APPENDECTOMY     • CHOLECYSTECTOMY     • OVARIAN CYST SURGERY     • TONSILLECTOMY         History reviewed  No pertinent family history  Medications have been verified          Objective /78 (BP Location: Right arm, Patient Position: Sitting, Cuff Size: Standard)   Pulse 59   Temp 97 6 °F (36 4 °C) (Tympanic)   Resp 20   Ht 5' 4" (1 626 m)   Wt 58 5 kg (129 lb)   SpO2 99%   BMI 22 14 kg/m²        Physical Exam     Physical Exam  Vitals and nursing note reviewed  Constitutional:       General: She is not in acute distress  Appearance: Normal appearance  She is not ill-appearing  HENT:      Head: Normocephalic and atraumatic  Right Ear: Tympanic membrane normal       Left Ear: Tympanic membrane normal       Nose: Congestion present  No rhinorrhea  Right Sinus: Maxillary sinus tenderness and frontal sinus tenderness present  Left Sinus: Maxillary sinus tenderness and frontal sinus tenderness present  Mouth/Throat:      Mouth: Mucous membranes are moist       Pharynx: Oropharynx is clear  No oropharyngeal exudate or posterior oropharyngeal erythema  Cardiovascular:      Rate and Rhythm: Normal rate and regular rhythm  Pulses: Normal pulses  Heart sounds: Normal heart sounds  Pulmonary:      Effort: Pulmonary effort is normal       Breath sounds: Normal breath sounds  Lymphadenopathy:      Cervical: No cervical adenopathy  Skin:     General: Skin is warm and dry  Findings: No rash  Neurological:      Mental Status: She is alert

## 2022-11-16 NOTE — PATIENT INSTRUCTIONS
Hydration and rest  Tylenol and motrin for pain  Continue cold and sinus medication otc  Nasal saline spray  Start azithromycin  Follow up with Primary Care Physician  Return to clinic or go to the nearest Emergency Department with new or worsening symptoms as discussed  Sinusitis   WHAT YOU NEED TO KNOW:   Sinusitis is inflammation or infection of your sinuses  Sinusitis is most often caused by a virus  Acute sinusitis may last up to 12 weeks  Chronic sinusitis lasts longer than 12 weeks  Recurrent sinusitis means you have 4 or more infections in 1 year  DISCHARGE INSTRUCTIONS:   Return to the emergency department if:   You have trouble breathing or wheezing that is getting worse  You have a stiff neck, a fever, or a bad headache  You cannot open your eye  Your eyeball bulges out or you cannot move your eye  You are more sleepy than normal, or you notice changes in your ability to think, move, or talk  You have swelling of your forehead or scalp  Call your doctor if:   You have vision changes, such as double vision  Your eye and eyelid are red, swollen, and painful  Your symptoms do not improve or go away after 10 days  You have nausea and are vomiting  Your nose is bleeding  You have questions or concerns about your condition or care  Medicines: Your symptoms may go away on their own  Your healthcare provider may recommend watchful waiting for up to 10 days before starting antibiotics  You may need any of the following:  Acetaminophen  decreases pain and fever  It is available without a doctor's order  Ask how much to take and how often to take it  Follow directions  Read the labels of all other medicines you are using to see if they also contain acetaminophen, or ask your doctor or pharmacist  Acetaminophen can cause liver damage if not taken correctly  Do not use more than 4 grams (4,000 milligrams) total of acetaminophen in one day  NSAIDs , such as ibuprofen, help decrease swelling, pain, and fever  This medicine is available with or without a doctor's order  NSAIDs can cause stomach bleeding or kidney problems in certain people  If you take blood thinner medicine, always ask your healthcare provider if NSAIDs are safe for you  Always read the medicine label and follow directions  Nasal steroid sprays  may help decrease inflammation in your nose and sinuses  Decongestants  help reduce swelling and drain mucus in the nose and sinuses  They may help you breathe easier  Antihistamines  help dry mucus in the nose and relieve sneezing  Antibiotics  help treat or prevent a bacterial infection  Take your medicine as directed  Contact your healthcare provider if you think your medicine is not helping or if you have side effects  Tell him or her if you are allergic to any medicine  Keep a list of the medicines, vitamins, and herbs you take  Include the amounts, and when and why you take them  Bring the list or the pill bottles to follow-up visits  Carry your medicine list with you in case of an emergency  Self-care:   Rinse your sinuses as directed  Use a sinus rinse device to rinse your nasal passages with a saline (salt water) solution or distilled water  Do not use tap water  This will help thin the mucus in your nose and rinse away pollen and dirt  It will also help reduce swelling so you can breathe normally  Use a humidifier  to increase air moisture in your home  This may make it easier for you to breathe and help decrease your cough  Sleep with your head elevated  Place an extra pillow under your head before you go to sleep to help your sinuses drain  Drink liquids as directed  Ask your healthcare provider how much liquid to drink each day and which liquids are best for you  Liquids will thin the mucus in your nose and help it drain  Avoid drinks that contain alcohol or caffeine       Do not smoke, and avoid secondhand smoke  Nicotine and other chemicals in cigarettes and cigars can make your symptoms worse  Ask your healthcare provider for information if you currently smoke and need help to quit  E-cigarettes or smokeless tobacco still contain nicotine  Talk to your healthcare provider before you use these products  Prevent the spread of germs:   Wash your hands often with soap and water  Wash your hands after you use the bathroom, change a child's diaper, or sneeze  Wash your hands before you prepare or eat food  Stay away from people who are sick  Some germs spread easily and quickly through contact  Follow up with your doctor as directed: You may be referred to an ear, nose, and throat specialist  Write down your questions so you remember to ask them during your visits  © Copyright [employer] 2022 Information is for End User's use only and may not be sold, redistributed or otherwise used for commercial purposes  All illustrations and images included in CareNotes® are the copyrighted property of [employer]  or [facility] [employer]   The above information is an  only  It is not intended as medical advice for individual conditions or treatments  Talk to your doctor, nurse or pharmacist before following any medical regimen to see if it is safe and effective for you

## 2022-11-19 DIAGNOSIS — J01.10 ACUTE NON-RECURRENT FRONTAL SINUSITIS: ICD-10-CM

## 2022-11-19 RX ORDER — FLUTICASONE PROPIONATE 50 MCG
SPRAY, SUSPENSION (ML) NASAL
Qty: 16 ML | OUTPATIENT
Start: 2022-11-19

## 2022-12-13 ENCOUNTER — OFFICE VISIT (OUTPATIENT)
Dept: URGENT CARE | Age: 57
End: 2022-12-13

## 2022-12-13 VITALS
RESPIRATION RATE: 18 BRPM | DIASTOLIC BLOOD PRESSURE: 82 MMHG | HEART RATE: 88 BPM | SYSTOLIC BLOOD PRESSURE: 118 MMHG | TEMPERATURE: 97.2 F | OXYGEN SATURATION: 99 %

## 2022-12-13 DIAGNOSIS — R68.83 CHILLS: Primary | ICD-10-CM

## 2022-12-13 NOTE — PROGRESS NOTES
[facility] [facility]        NAME: Alondra Lawrence is a 62 y o  female  : 1965    MRN: [id number]  DATE: 2022  TIME: 4:41 PM    Assessment and Plan   Chills [R68 83]  1  Chills  Covid/Flu-Office Collect            Patient Instructions     COVID & Flu testing collected today  No signs of bacterial infection on exam today  OTC meds and supportive care  Follow up with PCP in 2-3 days  Proceed to ER if symptoms worsen  Chief Complaint     Chief Complaint   Patient presents with   • Generalized Body Aches     Chills, body aches, sore throat,  cough, headache  Sx started yesterday  Taking IBU, mucinex sinus  Not flu or COVID vaccinated d/t lupus  History of Present Illness     62 y o  F p/w complaints of cough, chills, sore throat, body aches and HA x <24 hrs  Denies CP, SOB, NVD, fevers  +sick contacts  Taking Ibuprofen & Mucinex OTC  No Flu or COVID vaccines - states she is not vaccinated because of her Lupus  Review of Systems   Review of Systems   Constitutional: Positive for chills  Negative for fatigue and fever  HENT: Positive for sore throat  Negative for congestion, ear pain, facial swelling, hearing loss, rhinorrhea, sinus pressure, sneezing and trouble swallowing  Eyes: Negative for pain, redness and visual disturbance  Respiratory: Positive for cough  Negative for chest tightness, shortness of breath and wheezing  Cardiovascular: Negative for chest pain and palpitations  Gastrointestinal: Negative for abdominal pain, diarrhea, nausea and vomiting  Genitourinary: Negative for dysuria, flank pain, hematuria and pelvic pain  Musculoskeletal: Positive for myalgias  Negative for arthralgias and back pain  Skin: Negative for color change and rash  Neurological: Positive for headaches  Negative for dizziness, seizures, syncope, weakness and light-headedness  Psychiatric/Behavioral: Negative for confusion, hallucinations and sleep disturbance   The patient is not nervous/anxious  All other systems reviewed and are negative  Current Medications       Current Outpatient Medications:   •  Cholecalciferol (VITAMIN D3) 125 MCG (5000 UT) CAPS, Take 1 capsule by mouth daily, Disp: , Rfl:   •  clonazePAM (KlonoPIN) 1 mg tablet, , Disp: , Rfl:   •  hydroxychloroquine (PLAQUENIL) 200 mg tablet, hydroxychloroquine 200 mg tablet, Disp: , Rfl:   •  lidocaine (LIDODERM) 5 %, PLACE 1 PATCH ON THE SKIN DAILY   REMOVE & DISCARD PATCH WITHIN 12 HOURS OR AS DIRECTED BY MD, Disp: , Rfl:   •  OMEGA-3 FATTY ACIDS PO, Take 1 capsule by mouth daily, Disp: , Rfl:   •  ALPRAZolam (XANAX) 0 5 mg tablet, alprazolam 0 5 mg tablet (Patient not taking: No sig reported), Disp: , Rfl:   •  amitriptyline (ELAVIL) 10 mg tablet, , Disp: , Rfl:   •  carisoprodol (SOMA) 350 mg tablet, every 6 (six) hours as needed  (Patient not taking: No sig reported), Disp: , Rfl:   •  carisoprodol (SOMA) 350 mg tablet, Take 350 mg by mouth (Patient not taking: No sig reported), Disp: , Rfl:   •  clonazePAM (KlonoPIN) 1 mg tablet, clonazepam 1 mg tablet (Patient not taking: No sig reported), Disp: , Rfl:   •  FLUoxetine (PROzac) 40 MG capsule, TAKE TWO CAPSULES BY MOUTH ONCE EVERY DAY (Patient not taking: No sig reported), Disp: , Rfl:   •  FLUoxetine (PROzac) 40 MG capsule, fluoxetine 40 mg capsule, Disp: , Rfl:   •  fluticasone (FLONASE) 50 mcg/act nasal spray, 1 spray into each nostril daily, Disp: 18 2 mL, Rfl: 0  •  fluticasone (FLONASE) 50 mcg/act nasal spray, 1 spray into each nostril daily (Patient not taking: Reported on 11/16/2022), Disp: 11 1 mL, Rfl: 0  •  hydroxychloroquine (PLAQUENIL) 200 mg tablet, , Disp: , Rfl:   •  liothyronine (CYTOMEL) 5 mcg tablet, , Disp: , Rfl:   •  methylphenidate (METADATE ER) 10 MG ER tablet, TAKE ONE TABLET BY MOUTH TWICE DAILY IN THE MORNING AND IN THE AFTERNOON (Patient not taking: Reported on 9/21/2022), Disp: , Rfl:   •  Multiple Vitamins-Minerals (MULTIVITAMIN ADULT PO), 1 daily, Disp: , Rfl:   •  nitrofurantoin (MACROBID) 100 mg capsule, Take 1 capsule (100 mg total) by mouth 2 (two) times a day (Patient not taking: Reported on 11/16/2022), Disp: 10 capsule, Rfl: 0  •  nystatin (MYCOSTATIN) ointment, Apply topically 2 (two) times a day (Patient not taking: No sig reported), Disp: 30 g, Rfl: 0  •  ondansetron (Zofran ODT) 4 mg disintegrating tablet, Take 1 tablet (4 mg total) by mouth every 6 (six) hours as needed for nausea or vomiting (Patient not taking: No sig reported), Disp: 20 tablet, Rfl: 0  •  ondansetron (ZOFRAN) 4 mg tablet, Take 1 tablet (4 mg total) by mouth every 8 (eight) hours as needed for nausea or vomiting (Patient not taking: Reported on 11/16/2022), Disp: 6 tablet, Rfl: 0  •  phenazopyridine (PYRIDIUM) 200 mg tablet, Take 1 tablet (200 mg total) by mouth 3 (three) times a day with meals (Patient not taking: Reported on 11/26/2021), Disp: 10 tablet, Rfl: 0  •  saccharomyces boulardii (FLORASTOR) 250 mg capsule, 1 daily (Patient not taking: Reported on 11/16/2022), Disp: , Rfl:   •  temazepam (RESTORIL) 30 mg capsule, temazepam 30 mg capsule, Disp: , Rfl:   •  temazepam (RESTORIL) 30 mg capsule, temazepam 30 mg capsule (Patient not taking: No sig reported), Disp: , Rfl:   •  traMADol (ULTRAM) 50 mg tablet, Take 1 tablet (50 mg total) by mouth every 8 (eight) hours as needed for moderate pain (Patient not taking: Reported on 9/21/2022), Disp: 15 tablet, Rfl: 0  •  traZODone (DESYREL) 50 mg tablet, TAKE 1-2 TABLETS BY MOUTH AT BEDTIME AS NEEDED FOR SLEEP (Patient not taking: No sig reported), Disp: , Rfl:     Current Allergies     Allergies as of 12/13/2022 - Reviewed 12/13/2022   Allergen Reaction Noted   • Androgens  09/09/2019   • Sulfa antibiotics Other (See Comments) 06/05/2015            The following portions of the patient's history were reviewed and updated as appropriate: allergies, current medications, past family history, past medical history, past social history, past surgical history and problem list      Past Medical History:   Diagnosis Date   • Disease of thyroid gland    • Fibromyalgia    • Lupus ([facility] [address] )    • Proctitis    • 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[address] )    • Vitiligo        Past Surgical History:   Procedure Laterality Date   • ADENOIDECTOMY     • APPENDECTOMY     • CHOLECYSTECTOMY     • OVARIAN CYST SURGERY     • TONSILLECTOMY         History reviewed  No pertinent family history  Medications have been verified  Objective   /82   Pulse 88   Temp (!) 97 2 °F (36 2 °C) (Tympanic)   Resp 18   SpO2 99%   No LMP recorded  Patient is postmenopausal        Physical Exam     Physical Exam  Vitals reviewed  Constitutional:       General: She is not in acute distress  Appearance: Normal appearance  She is not toxic-appearing  HENT:      Head: Normocephalic  Right Ear: Tympanic membrane normal  Tympanic membrane is not erythematous or bulging  Left Ear: Tympanic membrane normal  Tympanic membrane is not erythematous or bulging  Nose: No congestion or rhinorrhea  Right Sinus: No maxillary sinus tenderness or frontal sinus tenderness  Left Sinus: No maxillary sinus tenderness or frontal sinus tenderness  Mouth/Throat:      Pharynx: Uvula midline  No oropharyngeal exudate, posterior oropharyngeal erythema or uvula swelling  Tonsils: No tonsillar exudate or tonsillar abscesses  Eyes:      Extraocular Movements: Extraocular movements intact  Conjunctiva/sclera: Conjunctivae normal       Pupils: Pupils are equal, round, and reactive to light  Cardiovascular:      Rate and Rhythm: Normal rate and regular rhythm  Pulses: Normal pulses  Heart sounds: Normal heart sounds  Pulmonary:      Effort: Pulmonary effort is normal  No tachypnea, accessory muscle usage or respiratory distress  Breath sounds: Normal breath sounds and air entry  No decreased air movement   No decreased breath sounds, wheezing, rhonchi or rales  Comments: CTAB  Abdominal:      General: Bowel sounds are normal       Palpations: Abdomen is soft  Tenderness: There is no abdominal tenderness  There is no right CVA tenderness or guarding  Musculoskeletal:         General: Normal range of motion  Cervical back: Normal range of motion  Lymphadenopathy:      Cervical: No cervical adenopathy  Skin:     General: Skin is warm and dry  Neurological:      General: No focal deficit present  Mental Status: She is alert  Sensory: Sensation is intact  Motor: Motor function is intact  Coordination: Coordination is intact  Gait: Gait is intact  Deep Tendon Reflexes: Reflexes are normal and symmetric

## 2022-12-13 NOTE — PATIENT INSTRUCTIONS
No signs of bacterial infection on exam today  Signs & symptoms consistent with viral illness  COVID & Flu testing collected - results in next 24 to 48 hours  OTC medications & supportive care as directed  Most viral illnesses last about 7-10 days with days 3-5 being the worst in severity of symptoms  If symptoms persist past 10 days, follow up with PCP  If symptoms worsen, proceed to the ER  IF PATIENT COVID RESULTS ARE POSITIVE, please continue home quarantine and contact patient primary care provider as soon as possible to inform of results and to discuss need for any further evaluation / recommendations / treatment options  Return to work / school / regular activities per school / work protocols or patient's PCP's instructions or recommendations  If patient does not have a primary care provider, you may call the [address] [place] [address] for questions and possible arrangement of PCP evaluation  [phone]  Please note - if you have COVID, acute recovery may take up to 4 weeks  Prolonged recovery may take several months or longer based on your age, comorbidities, severity of illness and vaccine status  As with any respiratory illness, transmission precautions are strongly advised  Masking  Isolating  Hand washing  Frequent cleaning of common use surfaces  Symptomatic treatment as needed  If sore throat:  Warm saltwater gargles every 1-2 hours while awake  Tylenol (or ibuprofen if allowed) as needed for any sore throat, fever, body aches and pains  If sinus pain / pressure:  Nasal saline spray may be helpful  Use every 2-3 hours while awake  Breathing in steamy air from shower and blowing nose to clear maybe helpful and can be done throughout day for comfort  Cold or warm compresses to head for comfort  Decongestant (if not contraindicated) may be helpful  Tylenol or Ibuprofen (if not contraindicated) may be helpful     Expectorant to keep mucous thin may also be helpful  PLEASE NOTE:  Yellow or green mucous does not necessarily mean a bacterial infection  Nasal drainage, congestion and / or cough typically peak around 7-10 days and then slowly resolve over next few weeks  (unless COVID, Influenza or RSV which can last longer)  You may use over the counter cough / cold medication as directed  This provider likes Mucinex D 12 hour formula - 1/2 to 1 tablet twice a day with full glass of fluid for daytime symptom relief (DO NOT TAKE IF YOU HAVE HIGH BLOOD PRESSURE  May take Coricidin HP and / or use plain Mucinex  If cough:  Cough drops, throat lozenges as needed  Vaporizer by the bedside  Warm tea with honey  May use nighttime cough medicine to help with sleep if needed -  Robitussin DM, Delsym or the like  Cough suppressants may cause drowsiness so recommend home use only  Please note that having a cough is not necessarily a bad thing  It's often your body's protective mechanism to help keep airways clear  If headache:  Tylenol (or Ibuprofen if allowed)  Cold compresses to head for comfort as needed  Rest   Fluids  If earache:  Tylenol (or Ibuprofen if allowed)  Warm compresses over ear(s) for comfort as needed  OTC nasal spray such as Flonase may be helpful  Rest   Fluids  If having fever or chills:  Tylenol (or Ibuprofen if allowed)  Recommend no work or outside activities  Rest   Fluids  (If you have a fever, it  typically lasts  approximately 3-5 days (unless influenza or COVID  Fever may last longer)  If diarrhea:  Clear liquids  You may want to avoid any milk products, fried - greasy foods, and / or spicy foods  If you are passing bloody diarrhea, seek further medical care  As a rule, we do not recommend using anti-diarrhea aids for acute diarrhea conditions         If significant worsening of your symptoms (ie  profound weakness, chest pain, shortness of breath, worst headache of your life, persistent vomiting), proceed to ER or call 911 for further evaluation  Follow up with your PCP if not improving over next 5-7 days  Retesting may be warranted if negative Covid test and recommended by your PCP

## 2022-12-13 NOTE — LETTER
[facility] Vincent Klinefelter [address]  [address] [address]  Dept: [phone]         December 13, 2022        Patient: Lisha Frankel  YOB: 1965    Lisha Frankel was seen and evaluated at [facility]  Please note if Covid and Flu tests are negative, they may return to work when fever free for 24 hours without the use of a fever reducing agent  If Covid or Flu test is positive, they may return to work on 12/17/2022, as this is 5 days from the onset of symptoms  Upon return, they must then adhere to strict masking for an additional 5 days  This is in accordance with CDC guidelines  Thank you            Sincerely,          Colt Dean

## 2023-01-25 ENCOUNTER — OFFICE VISIT (OUTPATIENT)
Dept: URGENT CARE | Age: 58
End: 2023-01-25

## 2023-01-25 VITALS
OXYGEN SATURATION: 98 % | DIASTOLIC BLOOD PRESSURE: 65 MMHG | HEART RATE: 78 BPM | SYSTOLIC BLOOD PRESSURE: 127 MMHG | RESPIRATION RATE: 18 BRPM | TEMPERATURE: 96.3 F

## 2023-01-25 DIAGNOSIS — J02.9 SORE THROAT: ICD-10-CM

## 2023-01-25 DIAGNOSIS — Z20.828 CONTACT W AND EXPOSURE TO OTH VIRAL COMMUNICABLE DISEASES: ICD-10-CM

## 2023-01-25 DIAGNOSIS — R09.81 NASAL CONGESTION: Primary | ICD-10-CM

## 2023-01-25 LAB — S PYO AG THROAT QL: NEGATIVE

## 2023-01-25 RX ORDER — FLUTICASONE PROPIONATE 50 MCG
2 SPRAY, SUSPENSION (ML) NASAL DAILY
Qty: 15.8 ML | Refills: 0 | Status: SHIPPED | OUTPATIENT
Start: 2023-01-25

## 2023-01-25 NOTE — PROGRESS NOTES
[facility] [employer] [facility]        NAME: Darrick Choudhary is a 62 y o  female  : 1965    MRN: [id number]  DATE: 2023  TIME: 10:54 AM      Assessment and Plan     Nasal congestion [R09 81]  1  Nasal congestion  fluticasone (FLONASE) 50 mcg/act nasal spray      2  Contact w and exposure to oth viral communicable diseases        3  Sore throat  POCT rapid strepA    Throat culture        Rapid strep negative  Throat culture sent  Will hold on antibiotics at this time    Patient Instructions   Use flonase as directed  Hydration and rest   Ibuprofen for pain relief and fever reduction  COVID/influenza testing  Use the [facility] to obtain lab results  PCP follow up in 3-5 days  Go to an emergency department if difficulty breathing occurs or if symptoms worsen  Recommended supplements for potential COVID-19 is the following: Vitamin D3 2000 IU  daily ,  Vitamin C 1g  every 12 hours , Multivitamin Daily     Chief Complaint     Chief Complaint   Patient presents with   • Sore Throat     Left ear throbbing pain 4 days ago, went away  Chills, body aches  Works with kids   sick at home  Taking motrin  Not flu or covid vaccinated d/t lupus  History of Present Illness     Patient is a 59-year-old female who presents with chills, body aches, ear pain and congestion for 4 days  Reports headache  States her  is sick with flu symptoms at home  States she does work around children  Review of Systems     Review of Systems   HENT: Positive for congestion and ear pain  Gastrointestinal: Negative for diarrhea and vomiting  Musculoskeletal: Positive for myalgias  All other systems reviewed and are negative          Current Medications       Current Outpatient Medications:   •  Cholecalciferol (VITAMIN D3) 125 MCG (5000 UT) CAPS, Take 1 capsule by mouth daily, Disp: , Rfl:   •  clonazePAM (KlonoPIN) 1 mg tablet, , Disp: , Rfl:   •  FLUoxetine (PROzac) 40 MG capsule, fluoxetine 40 mg capsule, Disp: , Rfl:   •  fluticasone (FLONASE) 50 mcg/act nasal spray, 2 sprays into each nostril daily, Disp: 15 8 mL, Rfl: 0  •  hydroxychloroquine (PLAQUENIL) 200 mg tablet, hydroxychloroquine 200 mg tablet, Disp: , Rfl:   •  lidocaine (LIDODERM) 5 %, PLACE 1 PATCH ON THE SKIN DAILY   REMOVE & DISCARD PATCH WITHIN 12 HOURS OR AS DIRECTED BY MD, Disp: , Rfl:   •  OMEGA-3 FATTY ACIDS PO, Take 1 capsule by mouth daily, Disp: , Rfl:   •  temazepam (RESTORIL) 30 mg capsule, temazepam 30 mg capsule, Disp: , Rfl:   •  ALPRAZolam (XANAX) 0 5 mg tablet, alprazolam 0 5 mg tablet (Patient not taking: No sig reported), Disp: , Rfl:   •  amitriptyline (ELAVIL) 10 mg tablet, , Disp: , Rfl:   •  carisoprodol (SOMA) 350 mg tablet, every 6 (six) hours as needed  (Patient not taking: Reported on 9/21/2022), Disp: , Rfl:   •  carisoprodol (SOMA) 350 mg tablet, Take 350 mg by mouth (Patient not taking: Reported on 11/26/2021), Disp: , Rfl:   •  clonazePAM (KlonoPIN) 1 mg tablet, clonazepam 1 mg tablet (Patient not taking: No sig reported), Disp: , Rfl:   •  FLUoxetine (PROzac) 40 MG capsule, TAKE TWO CAPSULES BY MOUTH ONCE EVERY DAY (Patient not taking: No sig reported), Disp: , Rfl:   •  hydroxychloroquine (PLAQUENIL) 200 mg tablet, , Disp: , Rfl:   •  liothyronine (CYTOMEL) 5 mcg tablet, , Disp: , Rfl:   •  methylphenidate (METADATE ER) 10 MG ER tablet, TAKE ONE TABLET BY MOUTH TWICE DAILY IN THE MORNING AND IN THE AFTERNOON (Patient not taking: Reported on 9/21/2022), Disp: , Rfl:   •  Multiple Vitamins-Minerals (MULTIVITAMIN ADULT PO), 1 daily (Patient not taking: Reported on 1/25/2023), Disp: , Rfl:   •  nitrofurantoin (MACROBID) 100 mg capsule, Take 1 capsule (100 mg total) by mouth 2 (two) times a day (Patient not taking: Reported on 11/16/2022), Disp: 10 capsule, Rfl: 0  •  nystatin (MYCOSTATIN) ointment, Apply topically 2 (two) times a day (Patient not taking: Reported on 11/20/2020), Disp: 30 g, Rfl: 0  • ondansetron (Zofran ODT) 4 mg disintegrating tablet, Take 1 tablet (4 mg total) by mouth every 6 (six) hours as needed for nausea or vomiting (Patient not taking: Reported on 9/21/2022), Disp: 20 tablet, Rfl: 0  •  ondansetron (ZOFRAN) 4 mg tablet, Take 1 tablet (4 mg total) by mouth every 8 (eight) hours as needed for nausea or vomiting (Patient not taking: Reported on 11/16/2022), Disp: 6 tablet, Rfl: 0  •  phenazopyridine (PYRIDIUM) 200 mg tablet, Take 1 tablet (200 mg total) by mouth 3 (three) times a day with meals (Patient not taking: Reported on 11/26/2021), Disp: 10 tablet, Rfl: 0  •  saccharomyces boulardii (FLORASTOR) 250 mg capsule, 1 daily (Patient not taking: Reported on 11/16/2022), Disp: , Rfl:   •  temazepam (RESTORIL) 30 mg capsule, temazepam 30 mg capsule (Patient not taking: No sig reported), Disp: , Rfl:   •  traMADol (ULTRAM) 50 mg tablet, Take 1 tablet (50 mg total) by mouth every 8 (eight) hours as needed for moderate pain (Patient not taking: Reported on 9/21/2022), Disp: 15 tablet, Rfl: 0  •  traZODone (DESYREL) 50 mg tablet, TAKE 1-2 TABLETS BY MOUTH AT BEDTIME AS NEEDED FOR SLEEP (Patient not taking: No sig reported), Disp: , Rfl:     Current Allergies     Allergies as of 01/25/2023 - Reviewed 01/25/2023   Allergen Reaction Noted   • Androgens  09/09/2019   • Sulfa antibiotics Other (See Comments) 06/05/2015              The following portions of the patient's history were reviewed and updated as appropriate: allergies, current medications, past family history, past medical history, past social history, past surgical history and problem list      Past Medical History:   Diagnosis Date   • Disease of thyroid gland    • Fibromyalgia    • Lupus ([facility] [address] )    • Proctitis    • 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[address] )    • Vitiligo        Past Surgical History:   Procedure Laterality Date   • ADENOIDECTOMY     • APPENDECTOMY     • CHOLECYSTECTOMY     • OVARIAN CYST SURGERY     • TONSILLECTOMY         History reviewed  No pertinent family history  Medications have been verified  Objective     /65   Pulse 78   Temp (!) 96 3 °F (35 7 °C) (Tympanic)   Resp 18   SpO2 98%   No LMP recorded  Patient is postmenopausal          Physical Exam     Physical Exam  Vitals and nursing note reviewed  Constitutional:       General: She is awake  She is not in acute distress  Appearance: Normal appearance  She is not ill-appearing, toxic-appearing or diaphoretic  HENT:      Right Ear: Tympanic membrane, ear canal and external ear normal       Left Ear: Tympanic membrane, ear canal and external ear normal       Nose: Congestion present  Mouth/Throat:      Lips: Pink  Mouth: Mucous membranes are moist       Pharynx: Uvula midline  Posterior oropharyngeal erythema (mild) present  No pharyngeal swelling, oropharyngeal exudate or uvula swelling  Tonsils: No tonsillar exudate  1+ on the right  1+ on the left  Cardiovascular:      Rate and Rhythm: Normal rate  Pulses: Normal pulses  Heart sounds: Normal heart sounds, S1 normal and S2 normal    Pulmonary:      Effort: Pulmonary effort is normal       Breath sounds: Normal breath sounds and air entry  Skin:     General: Skin is warm  Capillary Refill: Capillary refill takes less than 2 seconds  Neurological:      Mental Status: She is alert  Psychiatric:         Mood and Affect: Mood normal          Behavior: Behavior normal          Thought Content:  Thought content normal          Judgment: Judgment normal

## 2023-01-25 NOTE — PATIENT INSTRUCTIONS
Use flonase as directed  Hydration and rest   Ibuprofen for pain relief and fever reduction  COVID/influenza testing  Use the [facility] to obtain lab results  PCP follow up in 3-5 days  Go to an emergency department if difficulty breathing occurs or if symptoms worsen      Recommended supplements for potential COVID-19 is the following: Vitamin D3 2000 IU  daily ,  Vitamin C 1g  every 12 hours , Multivitamin Daily

## 2023-01-25 NOTE — LETTER
[facility] Jennyfer Lopez [address]  [address] [address]  Dept: [phone]    January 25, 2023    Patient: Scot Ivory  YOB: 1965    Scot Ivory was seen and evaluated at our [facility]  Please note if Covid and Flu tests are negative, they may return to work when fever free for 24 hours without the use of a fever reducing agent  If Covid or Flu test is positive, they may return to work on 1/27/23, as this is 5 days from the onset of symptoms  Upon return, they must then adhere to strict masking for an additional 5 days      Sincerely,    Eduardo Bradley

## 2023-01-27 LAB — BACTERIA THROAT CULT: NORMAL

## 2023-01-28 ENCOUNTER — OFFICE VISIT (OUTPATIENT)
Dept: URGENT CARE | Age: 58
End: 2023-01-28

## 2023-01-28 VITALS
HEIGHT: 63 IN | HEART RATE: 67 BPM | RESPIRATION RATE: 18 BRPM | WEIGHT: 124 LBS | OXYGEN SATURATION: 99 % | BODY MASS INDEX: 21.97 KG/M2 | TEMPERATURE: 97.7 F

## 2023-01-28 DIAGNOSIS — J34.89 SINUS PRESSURE: Primary | ICD-10-CM

## 2023-01-28 DIAGNOSIS — R35.0 URINARY FREQUENCY: ICD-10-CM

## 2023-01-28 LAB
SL AMB  POCT GLUCOSE, UA: NEGATIVE
SL AMB LEUKOCYTE ESTERASE,UA: NEGATIVE
SL AMB POCT BILIRUBIN,UA: NEGATIVE
SL AMB POCT BLOOD,UA: NEGATIVE
SL AMB POCT CLARITY,UA: CLEAR
SL AMB POCT COLOR,UA: YELLOW
SL AMB POCT KETONES,UA: NEGATIVE
SL AMB POCT NITRITE,UA: NEGATIVE
SL AMB POCT PH,UA: 6
SL AMB POCT SPECIFIC GRAVITY,UA: 1
SL AMB POCT URINE PROTEIN: NEGATIVE
SL AMB POCT UROBILINOGEN: 0.2

## 2023-01-28 RX ORDER — METHYLPREDNISOLONE 4 MG/1
TABLET ORAL
Qty: 21 EACH | Refills: 0 | Status: SHIPPED | OUTPATIENT
Start: 2023-01-28 | End: 2023-01-28 | Stop reason: CLARIF

## 2023-01-28 NOTE — PROGRESS NOTES
[address]        NAME: Maria Luisa Jimenez is a 62 y o  female  : 1965    MRN: [id number]  DATE: 2023  TIME: 10:31 AM    Assessment and Plan   Sinus pressure [J34 [id number]]  1  Sinus pressure  DISCONTINUED: methylPREDNISolone 4 MG tablet therapy pack      2  Urinary frequency  Urine culture    POCT urine dip            Patient Instructions     Patient extremely argumentative on exam, demanding antibiotics  Stormed out of room stating "you doctors are shit  You're useless "  Significant time spent with patient discussing viral vs bacterial sinusitis  Discussed OTC meds & supportive care  Discussed resistance & GI side effects associated with antibiotic therapy  Discussed there are no signs of bacterial infection on exam   UA was bland - no leuks or nitrites  Offered patient medrol dose pack - states she has an allergy to steroids  When allergies were reviewed by RN at start of visit, patient confirmed she only had allergies to Sulfa & androgens  When asked what the allergy was, 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 and couldn't sleep  Discussed with patient that only pertinent positive finding on exam was swollen nasal turbinates and clear PND  Inquired about daily antihistamine  Pt states she takes them and stated them was Motrin  I educated patient that Motrin is not an antihistamine  Follow up with PCP in 2-3 days  Proceed to ER if symptoms worsen      Chief Complaint     Chief Complaint   Patient presents with   • Possible UTI     Urinary frequency, pelvic pressure x 4 days, sinus pressure/pain, chills x 8 days         History of Present Illness     62 y o  F  States she's having urinary frequency & suprapubic pressure x 4 days  Complains of sinus pressure & chills x 8 days  Seen here 3 days ago - reported chills, ear pain, aches and congestion  COVID & Flu negative; throat culture negative  Flonase & Motrin OTC  No fevers  Denies CP or SOB  Denies hematuria or back pain    Review of Systems   Review of Systems   Constitutional: Positive for chills  Negative for fatigue and fever  HENT: Positive for sinus pressure  Negative for congestion, ear pain, facial swelling, hearing loss, rhinorrhea, sneezing, sore throat and trouble swallowing  Eyes: Negative for pain, redness and visual disturbance  Respiratory: Negative for cough, chest tightness, shortness of breath and wheezing  Cardiovascular: Negative for chest pain and palpitations  Gastrointestinal: Negative for abdominal pain, diarrhea, nausea and vomiting  Genitourinary: Positive for frequency and pelvic pain  Negative for dysuria, flank pain and hematuria  Musculoskeletal: Negative for arthralgias, back pain and myalgias  Skin: Negative for color change and rash  Neurological: Negative for dizziness, seizures, syncope, weakness, light-headedness and headaches  Psychiatric/Behavioral: Negative for confusion, hallucinations and sleep disturbance  The patient is not nervous/anxious  All other systems reviewed and are negative          Current Medications       Current Outpatient Medications:   •  Cholecalciferol (VITAMIN D3) 125 MCG (5000 UT) CAPS, Take 1 capsule by mouth daily, Disp: , Rfl:   •  clonazePAM (KlonoPIN) 1 mg tablet, , Disp: , Rfl:   •  FLUoxetine (PROzac) 40 MG capsule, fluoxetine 40 mg capsule, Disp: , Rfl:   •  fluticasone (FLONASE) 50 mcg/act nasal spray, 2 sprays into each nostril daily, Disp: 15 8 mL, Rfl: 0  •  hydroxychloroquine (PLAQUENIL) 200 mg tablet, hydroxychloroquine 200 mg tablet, Disp: , Rfl:   •  OMEGA-3 FATTY ACIDS PO, Take 1 capsule by mouth daily, Disp: , Rfl:   •  temazepam (RESTORIL) 30 mg capsule, temazepam 30 mg capsule, Disp: , Rfl:   •  ALPRAZolam (XANAX) 0 5 mg tablet, alprazolam 0 5 mg tablet (Patient not taking: No sig reported), Disp: , Rfl:   •  amitriptyline (ELAVIL) 10 mg tablet, , Disp: , Rfl:   •  carisoprodol (SOMA) 350 mg tablet, every 6 (six) hours as needed  (Patient not taking: Reported on 9/21/2022), Disp: , Rfl:   •  carisoprodol (SOMA) 350 mg tablet, Take 350 mg by mouth (Patient not taking: Reported on 11/26/2021), Disp: , Rfl:   •  clonazePAM (KlonoPIN) 1 mg tablet, clonazepam 1 mg tablet (Patient not taking: No sig reported), Disp: , Rfl:   •  FLUoxetine (PROzac) 40 MG capsule, TAKE TWO CAPSULES BY MOUTH ONCE EVERY DAY (Patient not taking: No sig reported), Disp: , Rfl:   •  hydroxychloroquine (PLAQUENIL) 200 mg tablet, , Disp: , Rfl:   •  lidocaine (LIDODERM) 5 %, PLACE 1 PATCH ON THE SKIN DAILY   REMOVE & DISCARD PATCH WITHIN 12 HOURS OR AS DIRECTED BY MD, Disp: , Rfl:   •  liothyronine (CYTOMEL) 5 mcg tablet, , Disp: , Rfl:   •  methylphenidate (METADATE ER) 10 MG ER tablet, TAKE ONE TABLET BY MOUTH TWICE DAILY IN THE MORNING AND IN THE AFTERNOON (Patient not taking: Reported on 9/21/2022), Disp: , Rfl:   •  Multiple Vitamins-Minerals (MULTIVITAMIN ADULT PO), 1 daily (Patient not taking: Reported on 1/25/2023), Disp: , Rfl:   •  nitrofurantoin (MACROBID) 100 mg capsule, Take 1 capsule (100 mg total) by mouth 2 (two) times a day (Patient not taking: Reported on 11/16/2022), Disp: 10 capsule, Rfl: 0  •  nystatin (MYCOSTATIN) ointment, Apply topically 2 (two) times a day (Patient not taking: Reported on 11/20/2020), Disp: 30 g, Rfl: 0  •  ondansetron (Zofran ODT) 4 mg disintegrating tablet, Take 1 tablet (4 mg total) by mouth every 6 (six) hours as needed for nausea or vomiting (Patient not taking: Reported on 9/21/2022), Disp: 20 tablet, Rfl: 0  •  ondansetron (ZOFRAN) 4 mg tablet, Take 1 tablet (4 mg total) by mouth every 8 (eight) hours as needed for nausea or vomiting (Patient not taking: Reported on 11/16/2022), Disp: 6 tablet, Rfl: 0  •  phenazopyridine (PYRIDIUM) 200 mg tablet, Take 1 tablet (200 mg total) by mouth 3 (three) times a day with meals (Patient not taking: Reported on 11/26/2021), Disp: 10 tablet, Rfl: 0  • saccharomyces boulardii (FLORASTOR) 250 mg capsule, 1 daily (Patient not taking: Reported on 11/16/2022), Disp: , Rfl:   •  temazepam (RESTORIL) 30 mg capsule, temazepam 30 mg capsule (Patient not taking: No sig reported), Disp: , Rfl:   •  traMADol (ULTRAM) 50 mg tablet, Take 1 tablet (50 mg total) by mouth every 8 (eight) hours as needed for moderate pain (Patient not taking: Reported on 9/21/2022), Disp: 15 tablet, Rfl: 0  •  traZODone (DESYREL) 50 mg tablet, TAKE 1-2 TABLETS BY MOUTH AT BEDTIME AS NEEDED FOR SLEEP (Patient not taking: No sig reported), Disp: , Rfl:     Current Allergies     Allergies as of 01/28/2023 - Reviewed 01/28/2023   Allergen Reaction Noted   • Androgens  09/09/2019   • Prednisone Other (See Comments) 01/28/2023   • Sulfa antibiotics Other (See Comments) 06/05/2015            The following portions of the patient's history were reviewed and updated as appropriate: allergies, current medications, past family history, past medical history, past social history, past surgical history and problem list      Past Medical History:   Diagnosis Date   • Disease of thyroid gland    • Fibromyalgia    • Lupus ([address] )    • Proctitis    • 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[facility] [address] )    • Vitiligo        Past Surgical History:   Procedure Laterality Date   • ADENOIDECTOMY     • APPENDECTOMY     • CHOLECYSTECTOMY     • OVARIAN CYST SURGERY     • TONSILLECTOMY         History reviewed  No pertinent family history  Medications have been verified  Objective   Pulse 67   Temp 97 7 °F (36 5 °C)   Resp 18   Ht 5' 3" (1 6 m)   Wt 56 2 kg (124 lb)   SpO2 99%   BMI 21 97 kg/m²   No LMP recorded  Patient is postmenopausal        Physical Exam     Physical Exam  Vitals reviewed  Constitutional:       General: She is not in acute distress  Appearance: Normal appearance  She is not toxic-appearing  Comments:   WNWD; NAD   HENT:      Head: Normocephalic        Right Ear: Tympanic membrane normal  Tympanic membrane is not erythematous or bulging  Left Ear: Tympanic membrane normal  Tympanic membrane is not erythematous or bulging  Ears:      Comments:   TMs intact - no erythema or bugling     Nose: No congestion or rhinorrhea  Right Turbinates: Swollen  Left Turbinates: Swollen  Right Sinus: No maxillary sinus tenderness or frontal sinus tenderness  Left Sinus: No maxillary sinus tenderness or frontal sinus tenderness  Mouth/Throat:      Pharynx: Oropharynx is clear  Uvula midline  No oropharyngeal exudate, posterior oropharyngeal erythema or uvula swelling  Tonsils: No tonsillar exudate or tonsillar abscesses  Comments:   No swelling or exudate  +PND with clear drainage  Eyes:      Extraocular Movements: Extraocular movements intact  Conjunctiva/sclera: Conjunctivae normal       Pupils: Pupils are equal, round, and reactive to light  Cardiovascular:      Rate and Rhythm: Normal rate and regular rhythm  Pulses: Normal pulses  Heart sounds: Normal heart sounds  Pulmonary:      Effort: No tachypnea, accessory muscle usage or respiratory distress  Breath sounds: Normal breath sounds and air entry  No decreased breath sounds, wheezing, rhonchi or rales  Comments:   CTAB no wheezing or stridor  No cough on exam  Abdominal:      General: Bowel sounds are normal       Palpations: Abdomen is soft  Tenderness: There is no abdominal tenderness  There is no right CVA tenderness or guarding  Musculoskeletal:         General: Normal range of motion  Cervical back: Normal range of motion  Lymphadenopathy:      Cervical: No cervical adenopathy  Skin:     General: Skin is warm and dry  Neurological:      General: No focal deficit present  Mental Status: She is alert  Sensory: Sensation is intact  Motor: Motor function is intact  Coordination: Coordination is intact  Gait: Gait is intact        Deep Tendon Reflexes: Reflexes are normal and symmetric

## 2023-01-28 NOTE — PATIENT INSTRUCTIONS
Urinalysis bland with no evidence of leukocytes or nitrites  No signs of bacterial infection on exam  Symptoms consistent with viral sinusitis  Follow up with PCP in 2-3 days as previously instructed  Proceed to ER if symptoms worsen

## 2023-01-30 LAB — BACTERIA UR CULT: NORMAL

## 2023-09-27 PROBLEM — K58.9 IRRITABLE BOWEL SYNDROME: Status: ACTIVE | Noted: 2021-11-01

## 2023-10-20 ENCOUNTER — TELEPHONE (OUTPATIENT)
Dept: GYNECOLOGY | Facility: CLINIC | Age: 58
End: 2023-10-20

## 2023-10-20 NOTE — TELEPHONE ENCOUNTER
I spoke to the patient who is interested in compounded hormones based on hormone testing and she was referred to Dr. Trav Rogers. Please take her out of my schedule.

## 2024-11-14 ENCOUNTER — TELEPHONE (OUTPATIENT)
Age: 59
End: 2024-11-14

## 2024-11-14 NOTE — TELEPHONE ENCOUNTER
Patient calls to ask if the practice is accepting new patient. I was able to respond thatwe are. Patient is looking for a female provider with a new patient opening in December. I offered the patient an appointment with [facility] for 12/30/24. Patient then thought that the appointment was too early. Patient then stated that she would call back if she was interested in scheduling.